# Patient Record
Sex: MALE | Race: WHITE | NOT HISPANIC OR LATINO | Employment: FULL TIME | URBAN - METROPOLITAN AREA
[De-identification: names, ages, dates, MRNs, and addresses within clinical notes are randomized per-mention and may not be internally consistent; named-entity substitution may affect disease eponyms.]

---

## 2017-06-07 ENCOUNTER — OFFICE VISIT (OUTPATIENT)
Dept: PULMONOLOGY | Facility: CLINIC | Age: 37
End: 2017-06-07
Payer: COMMERCIAL

## 2017-06-07 ENCOUNTER — CLINICAL SUPPORT (OUTPATIENT)
Dept: INTERNAL MEDICINE | Facility: CLINIC | Age: 37
End: 2017-06-07
Payer: COMMERCIAL

## 2017-06-07 VITALS
SYSTOLIC BLOOD PRESSURE: 124 MMHG | HEART RATE: 50 BPM | WEIGHT: 201 LBS | DIASTOLIC BLOOD PRESSURE: 80 MMHG | HEIGHT: 70 IN | BODY MASS INDEX: 28.77 KG/M2

## 2017-06-07 DIAGNOSIS — Z00.00 ROUTINE GENERAL MEDICAL EXAMINATION AT A HEALTH CARE FACILITY: Primary | ICD-10-CM

## 2017-06-07 DIAGNOSIS — Z00.00 ANNUAL PHYSICAL EXAM: Primary | ICD-10-CM

## 2017-06-07 LAB
ALBUMIN SERPL BCP-MCNC: 4.2 G/DL
ALP SERPL-CCNC: 74 U/L
ALT SERPL W/O P-5'-P-CCNC: 46 U/L
ANION GAP SERPL CALC-SCNC: 11 MMOL/L
AST SERPL-CCNC: 24 U/L
BILIRUB SERPL-MCNC: 0.8 MG/DL
BUN SERPL-MCNC: 12 MG/DL
CALCIUM SERPL-MCNC: 10 MG/DL
CHLORIDE SERPL-SCNC: 103 MMOL/L
CHOLEST/HDLC SERPL: 4 {RATIO}
CO2 SERPL-SCNC: 26 MMOL/L
CREAT SERPL-MCNC: 1 MG/DL
ERYTHROCYTE [DISTWIDTH] IN BLOOD BY AUTOMATED COUNT: 12.6 %
EST. GFR  (AFRICAN AMERICAN): >60 ML/MIN/1.73 M^2
EST. GFR  (NON AFRICAN AMERICAN): >60 ML/MIN/1.73 M^2
GLUCOSE SERPL-MCNC: 83 MG/DL
HCT VFR BLD AUTO: 47.5 %
HDL/CHOLESTEROL RATIO: 24.8 %
HDLC SERPL-MCNC: 218 MG/DL
HDLC SERPL-MCNC: 54 MG/DL
HGB BLD-MCNC: 16.3 G/DL
HIV 1+2 AB+HIV1 P24 AG SERPL QL IA: NEGATIVE
LDLC SERPL CALC-MCNC: 125.2 MG/DL
MCH RBC QN AUTO: 31.2 PG
MCHC RBC AUTO-ENTMCNC: 34.3 %
MCV RBC AUTO: 91 FL
NONHDLC SERPL-MCNC: 164 MG/DL
PLATELET # BLD AUTO: 231 K/UL
PMV BLD AUTO: 10.5 FL
POTASSIUM SERPL-SCNC: 4.4 MMOL/L
PROT SERPL-MCNC: 7.4 G/DL
RBC # BLD AUTO: 5.22 M/UL
SODIUM SERPL-SCNC: 140 MMOL/L
TRIGL SERPL-MCNC: 194 MG/DL
WBC # BLD AUTO: 7.96 K/UL

## 2017-06-07 PROCEDURE — 36415 COLL VENOUS BLD VENIPUNCTURE: CPT

## 2017-06-07 PROCEDURE — 86703 HIV-1/HIV-2 1 RESULT ANTBDY: CPT

## 2017-06-07 PROCEDURE — 99999 PR PBB SHADOW E&M-EST. PATIENT-LVL III: CPT | Mod: PBBFAC,,, | Performed by: INTERNAL MEDICINE

## 2017-06-07 PROCEDURE — 80061 LIPID PANEL: CPT

## 2017-06-07 PROCEDURE — 99395 PREV VISIT EST AGE 18-39: CPT | Mod: S$GLB,,, | Performed by: INTERNAL MEDICINE

## 2017-06-07 PROCEDURE — 80053 COMPREHEN METABOLIC PANEL: CPT

## 2017-06-07 PROCEDURE — 85027 COMPLETE CBC AUTOMATED: CPT
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
## 2017-06-07 NOTE — LETTER
June 7, 2017    Elpidio Mooney  [address]  [place] [address]             [facility]  [address]  [place] [address]  Phone: [phone] Dear Mr. Mooney:       Thank you for allowing me to serve you and perform your Executive Health exam on 6/7/2017. This letter will serve as a brief summary of the physical findings and laboratory/studies performed and recommendations at this time. At this assessment that is a mild elevation on your cholesterol and triglyceride values. This will be improved with diet modification.        If you have any questions or concerns, please don't hesitate to call.    Sincerely,        Jordan Barger MD

## 2018-01-12 ENCOUNTER — OFFICE VISIT (OUTPATIENT)
Dept: URGENT CARE | Facility: CLINIC | Age: 38
End: 2018-01-12
Payer: COMMERCIAL

## 2018-01-12 VITALS
WEIGHT: 201 LBS | BODY MASS INDEX: 28.77 KG/M2 | HEIGHT: 70 IN | OXYGEN SATURATION: 99 % | RESPIRATION RATE: 18 BRPM | DIASTOLIC BLOOD PRESSURE: 73 MMHG | TEMPERATURE: 101 F | HEART RATE: 78 BPM | SYSTOLIC BLOOD PRESSURE: 126 MMHG

## 2018-01-12 DIAGNOSIS — L02.212 ABSCESS OF BACK: Primary | ICD-10-CM

## 2018-01-12 PROCEDURE — 99214 OFFICE O/P EST MOD 30 MIN: CPT | Mod: S$GLB,,, | Performed by: EMERGENCY MEDICINE

## 2018-01-12 RX ORDER — HYDROCODONE BITARTRATE AND ACETAMINOPHEN 10; 325 MG/1; MG/1
1 TABLET ORAL EVERY 8 HOURS PRN
Qty: 8 TABLET | Refills: 0 | Status: SHIPPED | OUTPATIENT
Start: 2018-01-12 | End: 2018-01-15

## 2018-01-12 RX ORDER — DOXYCYCLINE 100 MG/1
100 CAPSULE ORAL 2 TIMES DAILY
Qty: 20 CAPSULE | Refills: 0 | Status: SHIPPED | OUTPATIENT
Start: 2018-01-12 | End: 2018-01-22

## 2018-01-12 RX ORDER — CLINDAMYCIN PHOSPHATE 150 MG/ML
300 INJECTION, SOLUTION INTRAVENOUS
Status: COMPLETED | OUTPATIENT
Start: 2018-01-12 | End: 2018-01-12

## 2018-01-12 RX ADMIN — CLINDAMYCIN PHOSPHATE 300 MG: 150 INJECTION, SOLUTION INTRAVENOUS at 12:01

## 2018-01-12 NOTE — PATIENT INSTRUCTIONS
If not improved in 1-2 days return.    Preet Padilla MD  Go to the Emergency Department for any problems  Call your PCP for follow up next available.    Abscess (Antibiotic Treatment Only)  An abscess (sometimes called a boil) happens when bacteria get trapped under the skin and start to grow. Pus forms inside the abscess as the body responds to the bacteria. An abscess can happen with an insect bite, ingrown hair, blocked oil gland, pimple, cyst, or puncture wound.  In the early stages, your wound may be red and tender. For this stage, you may get antibiotics. If the abscess does not get better with antibiotics, it will need to be drained with a small cut.  Home care  These tips will help you care for your abscess at home:  · Soak the wound in hot water or apply hot packs (small towel soaked in hot water) to the area for 20 minutes at a time. Do this 3 to 4 times a day.  · Do not cut, squeeze, or pop the boil yourself.  · Apply antibiotic cream or ointment to the skin 3 to 4 times a day, unless something else was prescribed. Some ointments include an antibiotic plus a pain reliever.  · If your doctor prescribed antibiotics, do not stop taking them until you have finished the medicine or the doctor tells you to stop.  · You may use an over-the-counter pain medicine to control pain, unless another pain medicine was prescribed. If you have chronic liver or kidney disease or ever had a stomach ulcer or gastrointestinal bleeding, talk with your doctor before using these any of these.  Follow-up care  Follow up with your healthcare provider, or as advised. Check your wound each day for the signs of worsening infection listed below.  When to seek medical advice  Get prompt medical attention if any of these occur:  · An increase in redness or swelling  · Red streaks in the skin leading away from the abscess  · An increase in local pain or swelling  · Fever of 100.4ºF (38ºC) or higher, or as directed by your healthcare  provider  · Pus or fluid coming from the abscess  · Boil returns after getting better  Date Last Reviewed: 9/1/2016  © 5222-4552 [employer]. [address] [place] [address], [place] [address]. All rights reserved. This information is not intended as a substitute for professional medical care. Always follow your healthcare professional's instructions.

## 2018-01-12 NOTE — PROGRESS NOTES
"Subjective:       Patient ID: Elpidio Mooney is a 37 y.o. male.    Vitals:  height is 5' 10" (1.778 m) and weight is 91.2 kg (201 lb). His temperature is 101.2 °F (38.4 °C) (abnormal). His blood pressure is 126/73 and his pulse is 78. His respiration is 18 and oxygen saturation is 99%.     Chief Complaint: Abscess    This is a 37 y.o. male with History reviewed. No pertinent past medical history.    who presents today with a chief complaint of abscess on his lower back.  The patient states that he has had 4 since the start of the year.        Abscess   Chronicity:  NewProgression Since Onset: rapidly worsening  Abscess location: back   Associated Symptoms: a fever, no chills  Characteristics: draining and painful    Pain Scale:  8/10  Treatments Tried:  Topical antibiotics  Relieved by:  Nothing    Review of Systems   Constitution: Positive for fever. Negative for chills.   HENT: Negative for sore throat.    Respiratory: Negative for shortness of breath.    Skin: Negative for itching and rash.   Musculoskeletal: Negative for joint pain.       Objective:      Physical Exam   Constitutional: He is oriented to person, place, and time. He appears well-developed and well-nourished.   HENT:   Head: Normocephalic and atraumatic.   Eyes: EOM are normal. Pupils are equal, round, and reactive to light.   Neck: Normal range of motion. Neck supple.   Cardiovascular: Normal rate, regular rhythm and normal heart sounds.    Pulmonary/Chest: Breath sounds normal.   Musculoskeletal: Normal range of motion.   Neurological: He is alert and oriented to person, place, and time.   Skin:   Mid lumbar area oval area of erythema/edema, mild amt pyogenic liquid expressed, approx 1.5 cm diameter   Psychiatric: He has a normal mood and affect. His behavior is normal.       Assessment:       1. Abscess of back        Plan:         Abscess of back  -     clindamycin injection 300 mg; Inject 2 mLs (300 mg total) into the muscle one time.  -     " doxycycline (VIBRAMYCIN) 100 MG Cap; Take 1 capsule (100 mg total) by mouth 2 (two) times daily. Generic or equivalent OK  Dispense: 20 capsule; Refill: 0  -     hydrocodone-acetaminophen 10-325mg (NORCO)  mg Tab; Take 1 tablet by mouth every 8 (eight) hours as needed for Pain. 0.5-1 tablet as needed  Dispense: 8 tablet; Refill: 0      Preet Padilla MD  Go to the Emergency Department for any problems  Call your PCP for follow up next available.

## 2018-01-15 ENCOUNTER — TELEPHONE (OUTPATIENT)
Dept: URGENT CARE | Facility: CLINIC | Age: 38
End: 2018-01-15

## 2018-01-15 NOTE — TELEPHONE ENCOUNTER
Call back from date of service 01/12/18. Went to Dermatologist after appointment here on Friday. GISELL

## 2021-02-26 DIAGNOSIS — Z00.00 ROUTINE GENERAL MEDICAL EXAMINATION AT A HEALTH CARE FACILITY: Primary | ICD-10-CM

## 2021-03-22 ENCOUNTER — OFFICE VISIT (OUTPATIENT)
Dept: INTERNAL MEDICINE | Facility: CLINIC | Age: 41
End: 2021-03-22
Payer: COMMERCIAL

## 2021-03-22 ENCOUNTER — CLINICAL SUPPORT (OUTPATIENT)
Dept: INTERNAL MEDICINE | Facility: CLINIC | Age: 41
End: 2021-03-22
Payer: COMMERCIAL

## 2021-03-22 ENCOUNTER — HOSPITAL ENCOUNTER (OUTPATIENT)
Dept: CARDIOLOGY | Facility: CLINIC | Age: 41
Discharge: HOME | End: 2021-03-22
Payer: COMMERCIAL

## 2021-03-22 VITALS
BODY MASS INDEX: 30.68 KG/M2 | DIASTOLIC BLOOD PRESSURE: 80 MMHG | WEIGHT: 213.88 LBS | SYSTOLIC BLOOD PRESSURE: 130 MMHG | HEART RATE: 71 BPM | OXYGEN SATURATION: 99 %

## 2021-03-22 DIAGNOSIS — E78.2 MIXED HYPERLIPIDEMIA: ICD-10-CM

## 2021-03-22 DIAGNOSIS — Z00.00 ANNUAL PHYSICAL EXAM: Primary | ICD-10-CM

## 2021-03-22 DIAGNOSIS — R51.9 MORNING HEADACHE: ICD-10-CM

## 2021-03-22 DIAGNOSIS — R68.82 DECREASED LIBIDO: ICD-10-CM

## 2021-03-22 DIAGNOSIS — E66.09 CLASS 1 OBESITY DUE TO EXCESS CALORIES WITH SERIOUS COMORBIDITY AND BODY MASS INDEX (BMI) OF 30.0 TO 30.9 IN ADULT: ICD-10-CM

## 2021-03-22 DIAGNOSIS — Z00.00 ROUTINE GENERAL MEDICAL EXAMINATION AT A HEALTH CARE FACILITY: ICD-10-CM

## 2021-03-22 DIAGNOSIS — Z12.83 SKIN EXAM, SCREENING FOR CANCER: ICD-10-CM

## 2021-03-22 DIAGNOSIS — Z00.00 ROUTINE GENERAL MEDICAL EXAMINATION AT A HEALTH CARE FACILITY: Primary | ICD-10-CM

## 2021-03-22 DIAGNOSIS — R06.83 SNORING: ICD-10-CM

## 2021-03-22 DIAGNOSIS — Z11.59 NEED FOR HEPATITIS C SCREENING TEST: ICD-10-CM

## 2021-03-22 DIAGNOSIS — R53.83 FATIGUE, UNSPECIFIED TYPE: ICD-10-CM

## 2021-03-22 DIAGNOSIS — R74.01 ELEVATED ALT MEASUREMENT: ICD-10-CM

## 2021-03-22 PROBLEM — L02.212 ABSCESS OF BACK: Status: RESOLVED | Noted: 2018-01-12 | Resolved: 2021-03-22

## 2021-03-22 PROBLEM — E66.811 CLASS 1 OBESITY DUE TO EXCESS CALORIES WITH SERIOUS COMORBIDITY AND BODY MASS INDEX (BMI) OF 30.0 TO 30.9 IN ADULT: Status: ACTIVE | Noted: 2021-03-22

## 2021-03-22 LAB
ALBUMIN SERPL BCP-MCNC: 4.4 G/DL (ref 3.5–5.2)
ALP SERPL-CCNC: 56 U/L (ref 55–135)
ALT SERPL W/O P-5'-P-CCNC: 71 U/L (ref 10–44)
ANION GAP SERPL CALC-SCNC: 4 MMOL/L (ref 8–16)
AST SERPL-CCNC: 33 U/L (ref 10–40)
BILIRUB SERPL-MCNC: 0.7 MG/DL (ref 0.1–1)
BUN SERPL-MCNC: 13 MG/DL (ref 6–20)
CALCIUM SERPL-MCNC: 9.3 MG/DL (ref 8.7–10.5)
CHLORIDE SERPL-SCNC: 105 MMOL/L (ref 95–110)
CHOLEST SERPL-MCNC: 212 MG/DL (ref 120–199)
CHOLEST/HDLC SERPL: 4.2 {RATIO} (ref 2–5)
CO2 SERPL-SCNC: 28 MMOL/L (ref 23–29)
CREAT SERPL-MCNC: 1 MG/DL (ref 0.5–1.4)
ERYTHROCYTE [DISTWIDTH] IN BLOOD BY AUTOMATED COUNT: 12.4 % (ref 11.5–14.5)
EST. GFR  (AFRICAN AMERICAN): >60 ML/MIN/1.73 M^2
EST. GFR  (NON AFRICAN AMERICAN): >60 ML/MIN/1.73 M^2
GLUCOSE SERPL-MCNC: 89 MG/DL (ref 70–110)
HCT VFR BLD AUTO: 46.7 % (ref 40–54)
HDLC SERPL-MCNC: 50 MG/DL (ref 40–75)
HDLC SERPL: 23.6 % (ref 20–50)
HGB BLD-MCNC: 15.6 G/DL (ref 14–18)
LDLC SERPL CALC-MCNC: 117 MG/DL (ref 63–159)
MCH RBC QN AUTO: 31.3 PG (ref 27–31)
MCHC RBC AUTO-ENTMCNC: 33.4 G/DL (ref 32–36)
MCV RBC AUTO: 94 FL (ref 82–98)
NONHDLC SERPL-MCNC: 162 MG/DL
PLATELET # BLD AUTO: 254 K/UL (ref 150–350)
PMV BLD AUTO: 10.5 FL (ref 9.2–12.9)
POTASSIUM SERPL-SCNC: 4.6 MMOL/L (ref 3.5–5.1)
PROT SERPL-MCNC: 7.2 G/DL (ref 6–8.4)
RBC # BLD AUTO: 4.99 M/UL (ref 4.6–6.2)
SODIUM SERPL-SCNC: 137 MMOL/L (ref 136–145)
TRIGL SERPL-MCNC: 225 MG/DL (ref 30–150)
WBC # BLD AUTO: 6.68 K/UL (ref 3.9–12.7)

## 2021-03-22 PROCEDURE — 93010 ELECTROCARDIOGRAM REPORT: CPT | Mod: S$GLB,,, | Performed by: INTERNAL MEDICINE

## 2021-03-22 PROCEDURE — 99385 PR PREVENTIVE VISIT,NEW,18-39: ICD-10-PCS | Mod: S$GLB,,, | Performed by: FAMILY MEDICINE

## 2021-03-22 PROCEDURE — 93005 EKG 12-LEAD: ICD-10-PCS | Mod: S$GLB,,, | Performed by: FAMILY MEDICINE

## 2021-03-22 PROCEDURE — 93010 EKG 12-LEAD: ICD-10-PCS | Mod: S$GLB,,, | Performed by: INTERNAL MEDICINE

## 2021-03-22 PROCEDURE — 99999 PR PBB SHADOW E&M-EST. PATIENT-LVL III: ICD-10-PCS | Mod: PBBFAC,,, | Performed by: FAMILY MEDICINE

## 2021-03-22 PROCEDURE — 85027 COMPLETE CBC AUTOMATED: CPT | Performed by: FAMILY MEDICINE

## 2021-03-22 PROCEDURE — 80061 LIPID PANEL: CPT | Performed by: FAMILY MEDICINE

## 2021-03-22 PROCEDURE — 99999 PR PBB SHADOW E&M-EST. PATIENT-LVL III: CPT | Mod: PBBFAC,,, | Performed by: FAMILY MEDICINE

## 2021-03-22 PROCEDURE — 93005 ELECTROCARDIOGRAM TRACING: CPT | Mod: S$GLB,,, | Performed by: FAMILY MEDICINE

## 2021-03-22 PROCEDURE — 80053 COMPREHEN METABOLIC PANEL: CPT | Performed by: FAMILY MEDICINE

## 2021-03-22 PROCEDURE — 99385 PREV VISIT NEW AGE 18-39: CPT | Mod: S$GLB,,, | Performed by: FAMILY MEDICINE

## 2021-03-22 PROCEDURE — 36415 COLL VENOUS BLD VENIPUNCTURE: CPT | Performed by: FAMILY MEDICINE

## 2021-03-22 RX ORDER — IBUPROFEN AND FAMOTIDINE 800; 26.6 MG/1; MG/1
TABLET, COATED ORAL
COMMUNITY
End: 2022-07-15

## 2021-04-27 ENCOUNTER — PATIENT MESSAGE (OUTPATIENT)
Dept: INTERNAL MEDICINE | Facility: CLINIC | Age: 41
End: 2021-04-27

## 2021-05-03 ENCOUNTER — TELEPHONE (OUTPATIENT)
Dept: INTERNAL MEDICINE | Facility: CLINIC | Age: 41
End: 2021-05-03

## 2021-05-03 ENCOUNTER — PATIENT MESSAGE (OUTPATIENT)
Dept: INTERNAL MEDICINE | Facility: CLINIC | Age: 41
End: 2021-05-03

## 2021-05-03 DIAGNOSIS — R74.01 ELEVATED ALT MEASUREMENT: Primary | ICD-10-CM

## 2021-05-04 ENCOUNTER — PATIENT MESSAGE (OUTPATIENT)
Dept: RESEARCH | Facility: HOSPITAL | Age: 41
End: 2021-05-04

## 2021-05-05 ENCOUNTER — PATIENT MESSAGE (OUTPATIENT)
Dept: INTERNAL MEDICINE | Facility: CLINIC | Age: 41
End: 2021-05-05

## 2021-05-19 ENCOUNTER — PATIENT MESSAGE (OUTPATIENT)
Dept: INTERNAL MEDICINE | Facility: CLINIC | Age: 41
End: 2021-05-19

## 2021-08-27 ENCOUNTER — OFFICE VISIT (OUTPATIENT)
Dept: DERMATOLOGY | Facility: CLINIC | Age: 41
End: 2021-08-27
Payer: COMMERCIAL

## 2021-08-27 DIAGNOSIS — L72.0 EPIDERMAL INCLUSION CYST: ICD-10-CM

## 2021-08-27 DIAGNOSIS — Q82.5 CONGENITAL NEVUS OF FLANK: ICD-10-CM

## 2021-08-27 DIAGNOSIS — L57.9 SKIN CHANGES DUE TO CHRONIC EXPOSURE TO NONIONIZING RADIATION: Primary | ICD-10-CM

## 2021-08-27 DIAGNOSIS — Z12.83 SKIN EXAM, SCREENING FOR CANCER: ICD-10-CM

## 2021-08-27 PROCEDURE — 99203 OFFICE O/P NEW LOW 30 MIN: CPT | Mod: S$GLB,,, | Performed by: DERMATOLOGY

## 2021-08-27 PROCEDURE — 99203 PR OFFICE/OUTPT VISIT, NEW, LEVL III, 30-44 MIN: ICD-10-PCS | Mod: S$GLB,,, | Performed by: DERMATOLOGY

## 2021-08-27 PROCEDURE — 99999 PR PBB SHADOW E&M-EST. PATIENT-LVL II: CPT | Mod: PBBFAC,,,

## 2021-08-27 PROCEDURE — 99999 PR PBB SHADOW E&M-EST. PATIENT-LVL II: ICD-10-PCS | Mod: PBBFAC,,,

## 2021-09-22 ENCOUNTER — PATIENT MESSAGE (OUTPATIENT)
Dept: INTERNAL MEDICINE | Facility: CLINIC | Age: 41
End: 2021-09-22

## 2021-09-22 DIAGNOSIS — U07.1 COVID-19 VIRUS INFECTION: Primary | ICD-10-CM

## 2022-04-05 DIAGNOSIS — Z00.00 ROUTINE GENERAL MEDICAL EXAMINATION AT A HEALTH CARE FACILITY: Primary | ICD-10-CM

## 2022-07-15 ENCOUNTER — OFFICE VISIT (OUTPATIENT)
Dept: INTERNAL MEDICINE | Facility: CLINIC | Age: 42
End: 2022-07-15
Payer: COMMERCIAL

## 2022-07-15 ENCOUNTER — CLINICAL SUPPORT (OUTPATIENT)
Dept: INTERNAL MEDICINE | Facility: CLINIC | Age: 42
End: 2022-07-15
Payer: COMMERCIAL

## 2022-07-15 ENCOUNTER — HOSPITAL ENCOUNTER (OUTPATIENT)
Dept: CARDIOLOGY | Facility: CLINIC | Age: 42
Discharge: HOME | End: 2022-07-15
Payer: COMMERCIAL

## 2022-07-15 VITALS
SYSTOLIC BLOOD PRESSURE: 135 MMHG | DIASTOLIC BLOOD PRESSURE: 80 MMHG | WEIGHT: 205 LBS | BODY MASS INDEX: 29.35 KG/M2 | HEIGHT: 70 IN

## 2022-07-15 DIAGNOSIS — Z00.00 ROUTINE GENERAL MEDICAL EXAMINATION AT A HEALTH CARE FACILITY: ICD-10-CM

## 2022-07-15 DIAGNOSIS — L57.8 ACTINIC DERMATITIS: ICD-10-CM

## 2022-07-15 DIAGNOSIS — Z00.00 ROUTINE GENERAL MEDICAL EXAMINATION AT A HEALTH CARE FACILITY: Primary | ICD-10-CM

## 2022-07-15 DIAGNOSIS — E66.3 OVERWEIGHT (BMI 25.0-29.9): ICD-10-CM

## 2022-07-15 DIAGNOSIS — Z00.00 ANNUAL PHYSICAL EXAM: Primary | ICD-10-CM

## 2022-07-15 DIAGNOSIS — K76.0 FATTY LIVER: ICD-10-CM

## 2022-07-15 PROBLEM — E66.09 CLASS 1 OBESITY DUE TO EXCESS CALORIES WITH SERIOUS COMORBIDITY AND BODY MASS INDEX (BMI) OF 30.0 TO 30.9 IN ADULT: Status: RESOLVED | Noted: 2021-03-22 | Resolved: 2022-07-15

## 2022-07-15 PROBLEM — E66.811 CLASS 1 OBESITY DUE TO EXCESS CALORIES WITH SERIOUS COMORBIDITY AND BODY MASS INDEX (BMI) OF 30.0 TO 30.9 IN ADULT: Status: RESOLVED | Noted: 2021-03-22 | Resolved: 2022-07-15

## 2022-07-15 LAB
ALBUMIN SERPL BCP-MCNC: 4.2 G/DL (ref 3.5–5.2)
ALP SERPL-CCNC: 46 U/L (ref 55–135)
ALT SERPL W/O P-5'-P-CCNC: 63 U/L (ref 10–44)
ANION GAP SERPL CALC-SCNC: 7 MMOL/L (ref 8–16)
AST SERPL-CCNC: 32 U/L (ref 10–40)
BILIRUB SERPL-MCNC: 0.8 MG/DL (ref 0.1–1)
BUN SERPL-MCNC: 13 MG/DL (ref 6–20)
CALCIUM SERPL-MCNC: 9.3 MG/DL (ref 8.7–10.5)
CHLORIDE SERPL-SCNC: 106 MMOL/L (ref 95–110)
CHOLEST SERPL-MCNC: 227 MG/DL (ref 120–199)
CHOLEST/HDLC SERPL: 4.5 {RATIO} (ref 2–5)
CO2 SERPL-SCNC: 27 MMOL/L (ref 23–29)
COMPLEXED PSA SERPL-MCNC: 0.3 NG/ML (ref 0–4)
CREAT SERPL-MCNC: 1 MG/DL (ref 0.5–1.4)
ERYTHROCYTE [DISTWIDTH] IN BLOOD BY AUTOMATED COUNT: 12.1 % (ref 11.5–14.5)
EST. GFR  (AFRICAN AMERICAN): >60 ML/MIN/1.73 M^2
EST. GFR  (NON AFRICAN AMERICAN): >60 ML/MIN/1.73 M^2
GLUCOSE SERPL-MCNC: 86 MG/DL (ref 70–110)
HCT VFR BLD AUTO: 45.5 % (ref 40–54)
HDLC SERPL-MCNC: 51 MG/DL (ref 40–75)
HDLC SERPL: 22.5 % (ref 20–50)
HGB BLD-MCNC: 15.6 G/DL (ref 14–18)
LDLC SERPL CALC-MCNC: 154.2 MG/DL (ref 63–159)
MCH RBC QN AUTO: 32.3 PG (ref 27–31)
MCHC RBC AUTO-ENTMCNC: 34.3 G/DL (ref 32–36)
MCV RBC AUTO: 94 FL (ref 82–98)
NONHDLC SERPL-MCNC: 176 MG/DL
PLATELET # BLD AUTO: 215 K/UL (ref 150–450)
PMV BLD AUTO: 10.7 FL (ref 9.2–12.9)
POTASSIUM SERPL-SCNC: 4.2 MMOL/L (ref 3.5–5.1)
PROT SERPL-MCNC: 6.9 G/DL (ref 6–8.4)
RBC # BLD AUTO: 4.83 M/UL (ref 4.6–6.2)
SODIUM SERPL-SCNC: 140 MMOL/L (ref 136–145)
TRIGL SERPL-MCNC: 109 MG/DL (ref 30–150)
WBC # BLD AUTO: 5.74 K/UL (ref 3.9–12.7)

## 2022-07-15 PROCEDURE — 93005 EKG 12-LEAD: ICD-10-PCS | Mod: S$GLB,,, | Performed by: INTERNAL MEDICINE

## 2022-07-15 PROCEDURE — 99396 PREV VISIT EST AGE 40-64: CPT | Mod: S$GLB,,, | Performed by: INTERNAL MEDICINE

## 2022-07-15 PROCEDURE — 99999 PR PBB SHADOW E&M-EST. PATIENT-LVL III: ICD-10-PCS | Mod: PBBFAC,,, | Performed by: INTERNAL MEDICINE

## 2022-07-15 PROCEDURE — 99999 PR PBB SHADOW E&M-EST. PATIENT-LVL I: CPT | Mod: PBBFAC,,,

## 2022-07-15 PROCEDURE — 93010 ELECTROCARDIOGRAM REPORT: CPT | Mod: S$GLB,,, | Performed by: INTERNAL MEDICINE

## 2022-07-15 PROCEDURE — 99999 PR PBB SHADOW E&M-EST. PATIENT-LVL III: CPT | Mod: PBBFAC,,, | Performed by: INTERNAL MEDICINE

## 2022-07-15 PROCEDURE — 99396 PR PREVENTIVE VISIT,EST,40-64: ICD-10-PCS | Mod: S$GLB,,, | Performed by: INTERNAL MEDICINE

## 2022-07-15 PROCEDURE — 99999 PR PBB SHADOW E&M-EST. PATIENT-LVL I: ICD-10-PCS | Mod: PBBFAC,,,

## 2022-07-15 PROCEDURE — 80053 COMPREHEN METABOLIC PANEL: CPT | Performed by: INTERNAL MEDICINE

## 2022-07-15 PROCEDURE — 80061 LIPID PANEL: CPT | Performed by: INTERNAL MEDICINE

## 2022-07-15 PROCEDURE — 93005 ELECTROCARDIOGRAM TRACING: CPT | Mod: S$GLB,,, | Performed by: INTERNAL MEDICINE

## 2022-07-15 PROCEDURE — 84153 ASSAY OF PSA TOTAL: CPT | Performed by: INTERNAL MEDICINE

## 2022-07-15 PROCEDURE — 93010 EKG 12-LEAD: ICD-10-PCS | Mod: S$GLB,,, | Performed by: INTERNAL MEDICINE

## 2022-07-15 PROCEDURE — 85027 COMPLETE CBC AUTOMATED: CPT | Performed by: INTERNAL MEDICINE

## 2022-07-15 NOTE — LETTER
July 15, 2022    Elpidio Mooney  [address]  [place] [address]             [facility]  [address]  [facility] [address]  Phone: [phone]  Fax: [phone] Dear Mr. Mooney:    Thank you for allowing me to serve you and perform your Executive Health exam on 7/15/2022.  This letter will serve a brief summary of the history, physical findings, and laboratory/studies performed and recommendations at that time.    Reason for Visit: Executive Health Preventive Physical Examination    Past Medical History:   Diagnosis Date    Abscess of back 01/12/2018    Hyperlipidemia        Past Surgical History:   Procedure Laterality Date    FEMUR FRACTURE SURGERY  2000    HERNIA REPAIR         Family History   Problem Relation Age of Onset    Heart disease Father         A fib    Cancer Father         skin    Skin cancer Father     No Known Problems Sister     No Known Problems Brother     Heart disease Maternal Grandmother         CHF    Cancer Maternal Grandfather         prostate vs rectal    Cancer Paternal Grandfather         bone marrow?       Social History     Socioeconomic History    Marital status:     Number of children: 2   Tobacco Use    Smoking status: Never Smoker    Smokeless tobacco: Never Used   Substance and Sexual Activity    Alcohol use: Yes     Alcohol/week: 3.0 standard drinks     Types: 3 Cans of beer per week    Drug use: No    Sexual activity: Yes     Partners: Female        Review of patient's allergies indicates:  No Known Allergies    No current outpatient medications on file.     Review of Systems  Review of Systems - Negative     Physical Exam:  General: General appearance: alert, well appearing, and in no distress.   Skin: Skin exam - normal coloration and turgor, no rashes, no suspicious skin lesions noted.  Sun exposed and sun damaged skin on face and arms  HEENT: Ears - bilateral TM's and external ear canals normal. , ENT exam  reveals - ENT exam normal, no neck nodes or sinus tenderness.   Lungs: Chest: clear to auscultation, no wheezes, rales or rhonchi, symmetric air entry.   Heart: CVS exam: normal rate, regular rhythm, normal S1, S2, no murmurs, rubs, clicks or gallops.   Extremities: Exam of extremities: peripheral pulses normal, no pedal edema, no clubbing or cyanosis    Labs:  Results for orders placed or performed in visit on 07/15/22   Comprehensive metabolic panel   Result Value Ref Range    Sodium 140 136 - 145 mmol/L    Potassium 4.2 3.5 - 5.1 mmol/L    Chloride 106 95 - 110 mmol/L    CO2 27 23 - 29 mmol/L    Glucose 86 70 - 110 mg/dL    BUN 13 6 - 20 mg/dL    Creatinine 1.0 0.5 - 1.4 mg/dL    Calcium 9.3 8.7 - 10.5 mg/dL    Total Protein 6.9 6.0 - 8.4 g/dL    Albumin 4.2 3.5 - 5.2 g/dL    Total Bilirubin 0.8 0.1 - 1.0 mg/dL    Alkaline Phosphatase 46 (L) 55 - 135 U/L    AST 32 10 - 40 U/L    ALT 63 (H) 10 - 44 U/L    Anion Gap 7 (L) 8 - 16 mmol/L    eGFR if African American >60.0 >60 mL/min/1.73 m^2    eGFR if non African American >60.0 >60 mL/min/1.73 m^2   CBC Without Differential   Result Value Ref Range    WBC 5.74 3.90 - 12.70 K/uL    RBC 4.83 4.60 - 6.20 M/uL    Hemoglobin 15.6 14.0 - 18.0 g/dL    Hematocrit 45.5 40.0 - 54.0 %    MCV 94 82 - 98 fL    MCH 32.3 (H) 27.0 - 31.0 pg    MCHC 34.3 32.0 - 36.0 g/dL    RDW 12.1 11.5 - 14.5 %    Platelets 215 150 - 450 K/uL    MPV 10.7 9.2 - 12.9 fL   Lipid panel   Result Value Ref Range    Cholesterol 227 (H) 120 - 199 mg/dL    Triglycerides 109 30 - 150 mg/dL    HDL 51 40 - 75 mg/dL    LDL Cholesterol 154.2 63.0 - 159.0 mg/dL    HDL/Cholesterol Ratio 22.5 20.0 - 50.0 %    Total Cholesterol/HDL Ratio 4.5 2.0 - 5.0    Non-HDL Cholesterol 176 mg/dL   PSA, Screening   Result Value Ref Range    PSA, Screen 0.30 0.00 - 4.00 ng/mL      EKG was acceptable.    Assessment/Recommendations:  Routine Health Maintenance; I recommend consideration of the pneumonia vaccine given your fatty  liver.  I also recommend consideration of a COVID booster.    With regard to your high cholesterol and fatty liver, I recommend adhering to a low-carbohydrate, portion control, plant based eating plan and regular vigorous exercise.  Try to curtail alcohol use.  I recommend a formal consultation with a liver specialist to make sure that you do not have any scarring in your liver.  Diet, exercise and weight loss are the recommended treatments for this.    Be sure to wear sunscreen every single day as well as to cover up to avoid sunburn.  I would recommend a baseline dermatology assessment at this time.    Please contact me should you have any questions or concerns regarding physical findings, or my recommendations.  I would recommend follow-up within the next 6-12 months regarding her liver and other medical issues.        Sincerely,            Susan Preciado MD, FACP

## 2022-07-15 NOTE — PROGRESS NOTES
PE; here with wife and 2 kids.    Commercial AC, refrigeration- very busy.    Fatty liver, discussed.  He was aware.  He does not drink alcohol very frequently but sometimes does drink to excess on the weekends.    Deliberate weight loss.  Stays very active.  No apnea symptoms.    Lipids also reviewed.    The 10-year ASCVD risk score (Samira PEARSON Jr., et al., 2013) is: 1.5%    Values used to calculate the score:      Age: 41 years      Sex: Male      Is Non- : No      Diabetic: No      Tobacco smoker: No      Systolic Blood Pressure: 135 mmHg      Is BP treated: No      HDL Cholesterol: 51 mg/dL      Total Cholesterol: 227 mg/dL     Patient Active Problem List   Diagnosis    Mixed hyperlipidemia    Elevated ALT measurement    Overweight (BMI 25.0-29.9)    Fatty liver: see u/s 5/21     Review of Systems   Constitutional: Negative for chills, fever, malaise/fatigue and weight loss.   HENT: Negative for congestion, ear pain and hearing loss.    Eyes: Negative for blurred vision, double vision and pain.   Respiratory: Negative for cough, hemoptysis, shortness of breath and wheezing.    Cardiovascular: Negative for chest pain, palpitations, orthopnea and claudication.   Gastrointestinal: Negative for abdominal pain, heartburn and nausea.   Genitourinary: Negative for dysuria and hematuria.   Musculoskeletal: Negative for falls, joint pain and myalgias.   Skin: Negative for rash.   Neurological: Negative for dizziness, tremors, focal weakness and headaches.   Endo/Heme/Allergies: Negative for polydipsia. Does not bruise/bleed easily.   Psychiatric/Behavioral: Negative for depression, hallucinations and substance abuse. The patient does not have insomnia.      Physical Exam  Constitutional:       Appearance: He is well-developed.   HENT:      Head: Normocephalic and atraumatic.      Right Ear: External ear normal.      Left Ear: External ear normal.   Eyes:      Extraocular Movements: Extraocular  movements intact.      Conjunctiva/sclera: Conjunctivae normal.   Neck:      Thyroid: No thyromegaly.   Cardiovascular:      Rate and Rhythm: Normal rate and regular rhythm.      Heart sounds: No murmur heard.  Pulmonary:      Effort: Pulmonary effort is normal. No respiratory distress.      Breath sounds: Normal breath sounds. No wheezing.   Abdominal:      General: There is no distension.      Palpations: Abdomen is soft.      Tenderness: There is no abdominal tenderness.   Musculoskeletal:         General: No tenderness.      Cervical back: Normal range of motion and neck supple.      Right lower leg: No edema.      Left lower leg: No edema.   Lymphadenopathy:      Cervical: No cervical adenopathy.   Skin:     General: Skin is warm and dry.      Comments: Sun exposed and sun damaged skin on face and arms     Neurological:      General: No focal deficit present.      Mental Status: He is alert and oriented to person, place, and time.      Cranial Nerves: No cranial nerve deficit.   Psychiatric:         Mood and Affect: Mood normal.         Behavior: Behavior normal.         Thought Content: Thought content normal.         Judgment: Judgment normal.     Elpidio was seen today for [facility].    Diagnoses and all orders for this visit:    Annual physical exam    Overweight (BMI 25.0-29.9)    Fatty liver: see u/s 5/21  -     Ambulatory referral/consult to Hepatology; Future    Actinic dermatitis; sunscreen, sun avoidance and hygienic measures reviewed; recommended dermatology assessment    Portion control, exercise and lifestyle modification reviewed  Curtail alcohol  Hepatology consultation  Plant based eating  Repeat labs within the next 6-12 months, sooner with problems in the interim  COVID vaccine discussed; consider Pneumovax  EKG acceptable

## 2022-07-16 ENCOUNTER — TELEPHONE (OUTPATIENT)
Dept: HEPATOLOGY | Facility: CLINIC | Age: 42
End: 2022-07-16
Payer: COMMERCIAL

## 2022-09-23 ENCOUNTER — OFFICE VISIT (OUTPATIENT)
Dept: HEPATOLOGY | Facility: CLINIC | Age: 42
End: 2022-09-23
Payer: COMMERCIAL

## 2022-09-23 ENCOUNTER — PROCEDURE VISIT (OUTPATIENT)
Dept: HEPATOLOGY | Facility: CLINIC | Age: 42
End: 2022-09-23
Payer: COMMERCIAL

## 2022-09-23 ENCOUNTER — PATIENT MESSAGE (OUTPATIENT)
Dept: HEPATOLOGY | Facility: CLINIC | Age: 42
End: 2022-09-23

## 2022-09-23 VITALS
WEIGHT: 205.25 LBS | DIASTOLIC BLOOD PRESSURE: 82 MMHG | HEIGHT: 70 IN | HEART RATE: 65 BPM | RESPIRATION RATE: 18 BRPM | BODY MASS INDEX: 29.38 KG/M2 | TEMPERATURE: 98 F | OXYGEN SATURATION: 97 % | SYSTOLIC BLOOD PRESSURE: 139 MMHG

## 2022-09-23 DIAGNOSIS — K76.0 FATTY LIVER: Primary | ICD-10-CM

## 2022-09-23 DIAGNOSIS — E66.3 OVERWEIGHT (BMI 25.0-29.9): ICD-10-CM

## 2022-09-23 DIAGNOSIS — R74.8 ELEVATED LIVER ENZYMES: ICD-10-CM

## 2022-09-23 DIAGNOSIS — E78.2 MIXED HYPERLIPIDEMIA: ICD-10-CM

## 2022-09-23 DIAGNOSIS — K76.0 FATTY LIVER: ICD-10-CM

## 2022-09-23 PROCEDURE — 99204 OFFICE O/P NEW MOD 45 MIN: CPT | Mod: S$GLB,,, | Performed by: NURSE PRACTITIONER

## 2022-09-23 PROCEDURE — 99999 PR PBB SHADOW E&M-EST. PATIENT-LVL IV: CPT | Mod: PBBFAC,,, | Performed by: NURSE PRACTITIONER

## 2022-09-23 PROCEDURE — 99999 PR PBB SHADOW E&M-EST. PATIENT-LVL IV: ICD-10-PCS | Mod: PBBFAC,,, | Performed by: NURSE PRACTITIONER

## 2022-09-23 PROCEDURE — 91200 FIBROSCAN NEW ORLEANS (VIBRATION CONTROLLED TRANSIENT ELASTOGRAPHY): ICD-10-PCS | Mod: S$GLB,,, | Performed by: NURSE PRACTITIONER

## 2022-09-23 PROCEDURE — 99204 PR OFFICE/OUTPT VISIT, NEW, LEVL IV, 45-59 MIN: ICD-10-PCS | Mod: S$GLB,,, | Performed by: NURSE PRACTITIONER

## 2022-09-23 PROCEDURE — 91200 LIVER ELASTOGRAPHY: CPT | Mod: S$GLB,,, | Performed by: NURSE PRACTITIONER

## 2022-09-23 NOTE — PATIENT INSTRUCTIONS
Fibroscan today to assess for any liver scarring/damage from fatty liver  Labs to rule out other causes of liver problems         Fatty liver    There is no FDA approved therapy for non-alcoholic fatty liver disease (NAFLD); therefore, lifestyle changes are important:  1. Ongoing weight loss efforts  2. Low carb/sugar, high protein diet.   3. Exercise, 5 days per week, 30 minutes per day, as tolerated  4. Recommend good control of cholesterol, blood pressure, blood sugar levels (as these are risk factors for fatty liver). Cholesterol lowering medications including statins are typically safe and beneficial (even if liver enzymes are elevated).    5. Limit alcohol consumption, no more than 2 serving(s) of alcohol in any day (1 serving is 5 ounces of wine, 12 ounces of beer, or 1.5 ounces of liquor). Do not recommend daily alcohol use.      In some people, fatty liver can progress to steatohepatitis (inflamatory fatty liver) and possibly to cirrhosis, putting one at increased risk for liver cancer and liver failure. Lifestyle changes can help to decrease this risk.     Ask about our fatty liver/NAGEL clinical trials if you have fibrosis / scar tissue related to fatty liver.        Additional Resources:    Websites with information about fatty liver and inflammation related to fatty liver (NAGEL): www.nashtruth.LeadiD and www.nashactually.com   [facility]: Non-Alcoholic Fatty Liver Disease (NAFLD)    Facebook support group with tips and recipes:   Non-Alcoholic Fatty Liver Disease (NAFLD) Diet & Nutrition Support     Can download MyFitness Pal or [employer] It david to add up your carbohydrates throughout the day.   Tip -- Avoid beverages with calories or carbohydrates.   Try www.Nettle.LeadiD for recipes.    If interested in seeing a dietician to create a weight loss plan, contact the dietician team at [facility]: [email or web address].  You can also call to schedule a consult with a dietician: [phone]  *Virtual  visits are available with one of our dieticians.     If you have diabetes or high blood pressure, you can meet with a Health  through [facility] program. Let us know if you are interested in a referral.     Let us know if you are interested in a referral to [facility] program.             Snacks: (100-200 calories; >5g protein)     Dairy combos:  - 2 wedges Laughing Cow - Light Herb & Garlic Cheese with sliced cucumber or green bell pepper  - 1/2 cup low-fat cottage cheese with ¼ cup fruit or ¼ cup salsa  - 1/2 cup low fat cottage cheese with 10-15 cherry tomatoes  - 1 low-fat cheese stick with 8 cherry tomatoes or 1 serving fresh fruit    Meat and other proteins:  - 2 slices of turkey leger  - Boiled eggs (can buy at [employer] already boiled w/ shell removed)  - Khurram Blake's Beef Steak - 14g protein! (similar to beef jerky but very lean)  - 4 thin slices fat-free turkey breast and 1 slice low-fat cheese  - 4 thin slices fat-free honey ham with wedge of melon  - For convenience,  [place] read, snack, go (deli meat and cheese rolls)  - P3 packets (Protein packs w/ cheese, nuts, lean deli meat)  - 1/4 cup unsalted nuts with ½ cup fruit  - 6-8 edamame pods (equivalent to about 1/4 cup edamame without pods)    Yogurt, puddings:  - MHP Fit and Lean Protein Pudding (find at [employer] - per 1 cup serving = 100 calories, 15 g protein, 0 g sugar)  - 6-oz container Dannon Light n' Fit vanilla yogurt, topped with 1oz unsalted nuts         - 2 Tbsp PB2 mixed in light or greek yogurt or sugar-free pudding    Fruits and vegetable combos:  - Apple, celery or baby carrots spread with 2 Tbsp PB2  - Apple slices with 1 oz slice low-fat cheese  - Apple slices dipped in 2 Tbsp of PB2  - Celery, cucumber, bell pepper or baby carrots dipped in ¼ cup hummus bean spread   - Celery, cucumber, baby carrots dipped in high protein greek yogurt (Mix 16 oz plain greek yogurt + 1 packet of [place]  "ranch dip mix)    Drinks:  - 8 oz glass of FAIRLIFE fat free milk (13 g protein)  - 8 oz glass of FAIRLIFE fat free milk + 1 packet of sugar-free hot cocoa  - Add Atkins advantage Cafe Caramel shake to decaf coffee. Serve hot or blend with ice for "frappaccino" like drink    PROTEIN:  - Protein Shakes: Atkins, Low Carb Slim Fast, EAS light, Muscle Milk Light, Iconic etc.  - Protein Powder: [employer]NC Soy95, Isopure, Nectar, UNJURY, Whey Gourmet, etc. Mix 1 scoop powder with 8oz skim/1% milk or light soymilk.  - Protein Bars: Atkins, EAS, Pure Protein,  Quest, Think Thin, Detour, etc. Must have 0-4 grams sugar - Read the label.    "

## 2022-09-23 NOTE — PROGRESS NOTES
[facility] - New Patient     REFERRING PROVIDER: Dr. Susan Preciado  PCP: Susan Preciado MD    Chief Complaint: Fatty liver, elevated liver enzymes      HISTORY     This is a 41 y.o. male referred for evaluation of fatty liver and elevated liver enzymes.    Fatty liver first noted on abd US 5/2021. Imaging with mildly enlarged liver and spleen.    Review of labs:  - Transaminases: elevated sine 2017, mainly ALT, 40s-70s  - Alk phos: WNL-low  - Synthetic liver function: WNL  - Platelets: WNL    Workup thus far:  Serologies:   Lab Results   Component Value Date    HEPBSAG Negative 05/04/2021    HEPBIGM Negative 05/04/2021    HEPCAB Negative 05/04/2021    HEPAIGM Negative 05/04/2021       Risk factors for fatty liver:   Weight -- Body mass index is 29.45 kg/m².     Weight trending up over the last few years                     Dyslipidemia -- yes                               Insulin resistance / diabetes -- no, no though HgbA1c for review        Hypertension -- no  Alcohol use -- weekends, case of beer over the course of weekend    Family history:  Dad has fatty liver     Meds:  -Rx: none at this time  -OTC, herbs/supplements: n/a  No recent medication changes.     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    Feels well overall.  No s/s hepatic decompensation.        Past medical history, surgical history, problem list, family history, social history, allergies: Reviewed and updated in the appropriate section of the electronic medical record.      No current outpatient medications on file.     No current facility-administered medications for this visit.     Medication list reviewed and updated.      Review of Systems   Constitutional: Negative for fatigue or unexpected weight change.   Respiratory: Negative for shortness of breath.    Cardiovascular: Negative for leg swelling.  Gastrointestinal: Negative for abdominal distention, abdominal pain,  "diarrhea, nausea, and vomiting. Negative for blood in stool, melena, or hematemesis.  Musculoskeletal: Negative for myalgias.    Skin: Negative for itching.  Neurological: Negative for dizziness or tremors. Negative for confusion, slowed mentation, or memory loss.   Hematological: Does not bruise/bleed easily.   Psychiatric: Negative for mood changes or sleep disturbance.      Physical Exam   Constitutional: Well-nourished. No distress. Alert and oriented.  Eyes: No scleral icterus.   Pulmonary/Chest: Respiratory effort normal. No respiratory distress.   Abdominal: No distension, no ascites appreciated.   Extremities: No edema.   Neurological: No tremor or asterixis. Gait normal.  Skin: No jaundice. No spider telangiectasias or palmar erythema.  Psychiatric: Normal mood and affect. Speech, behavior, and thought content normal. No depression or anxiety noted.       Vitals reviewed.  /82 (BP Location: Right arm, Patient Position: Sitting, BP Method: Large (Automatic))   Pulse 65   Temp 98.2 °F (36.8 °C) (Oral)   Resp 18   Ht 5' 10" (1.778 m)   Wt 93.1 kg (205 lb 4 oz)   SpO2 97%   BMI 29.45 kg/m²         LABS & DIAGNOSTIC STUDIES     I have personally reviewed pertinent laboratory findings:    Lab Results   Component Value Date    ALT 63 (H) 07/15/2022    AST 32 07/15/2022    ALKPHOS 46 (L) 07/15/2022    BILITOT 0.8 07/15/2022    ALBUMIN 4.2 07/15/2022       Lab Results   Component Value Date    WBC 5.74 07/15/2022    HGB 15.6 07/15/2022    HCT 45.5 07/15/2022    MCV 94 07/15/2022     07/15/2022       Lab Results   Component Value Date     07/15/2022    K 4.2 07/15/2022    BUN 13 07/15/2022    CREATININE 1.0 07/15/2022    ESTGFRAFRICA >60.0 07/15/2022    EGFRNONAA >60.0 07/15/2022       Lab Results   Component Value Date    HEPBSAG Negative 05/04/2021    HEPBIGM Negative 05/04/2021    HEPCAB Negative 05/04/2021    HEPAIGM Negative 05/04/2021    [id number] Negative 06/07/2017       No results " found for: AFP    I have personally reviewed the following result reports:  Abdominal US - 5/19/21          ASSESSMENT & PLAN     41 y.o. male with:    1. Fatty liver, elevated liver enzymes  -- Obtain Fibroscan for fibrosis and steatosis staging  -- Elevated liver enzymes are likely due to fatty liver though will complete serologic workup to rule out other causes of liver disease. Anticipate improvement in liver enzymes with weight loss and decreasing alcohol.    Fatty liver discussion:  - Reviewed risk of hepatic inflammation and subsequent fibrosis from fatty liver.  - At this time, the only treatment for fatty liver is weight loss and maintaining good control of metabolic risk factors (blood pressure, cholesterol, and blood sugar).   - Alcohol use is also a risk factor for fatty liver. If no advanced fibrosis, should limit alcohol to max 2 standard drinks/sitting. Do not recommend daily alcohol use or drinking alcohol most days per week.     2. Body mass index is 29.45 kg/m²., HLD  -- Reviewed weight loss strategies including dietary changes and physical activity. Discussed low carbohydrate, low sugar diet. Recommend weight loss goal of 10% (about 20 lb).   -- We discussed additional weight loss resources including dietician consult and [facility] program.        Orders Placed This Encounter   Procedures    FibroScan [place] (Vibration Controlled Transient Elastography)    Alpha-1-Antitrypsin    Ceruloplasmin    MICHAELLE Screen w/Reflex    Anti-Smooth Muscle Antibody    IgG    Ferritin    Iron and TIBC    Hepatitis A antibody, IgG    Hepatitis B Core Antibody, Total    Hepatitis B Surface Ab, Qualitative    Hemoglobin A1C       *See AVS for patient education and instructions.       Return to clinic pending above results.      Thank you for allowing me to participate in the care of [facility]        Duration of encounter: 45 min  This includes face to face  time and non-face to face time preparing to see the patient (eg, review of tests), obtaining and/or reviewing separately obtained history, documenting clinical information in the electronic or other health record, independently interpreting results and communicating results to the patient/family/caregiver, or Care coordination.

## 2022-09-23 NOTE — PROCEDURES
FibroScan [place] (Vibration Controlled Transient Elastography)    Date/Time: 9/23/2022 11:45 AM  Performed by: Carmencita Centeno NP  Authorized by: Carmencita Centeno NP     Diagnosis:  NAFLD    Probe:  M    Universal Protocol: Patient's identity, procedure and site were verified, confirmatory pause was performed.  Discussed procedure including risks and potential complications.  Questions answered.  Patient verbalizes understanding and wishes to proceed with VCTE.     Procedure: After providing explanations of the procedure, patient was placed in the supine position with right arm in maximum abduction to allow optimal exposure of right lateral abdomen.  Patient was briefly assessed, Testing was performed in the mid-axillary location, 50Hz Shear Wave pulses were applied and the resulting Shear Wave and Propagation Speed detected with a 3.5 MHz ultrasonic signal, using the FibroScan probe, Skin to liver capsule distance and liver parenchyma were accessed during the entire examination with the FibroScan probe, Patient was instructed to breathe normally and to abstain from sudden movements during the procedure, allowing for random measurements of liver stiffness. At least 10 Shear Waves were produced, Individual measurements of each Shear Wave were calculated.  Patient tolerated the procedure well with no complications.  Meets discharge criteria as was dismissed.  Rates pain 0 out of 10.  Patient will follow up with ordering provider to review results.    Findings  Median liver stiffness score:  6.3  CAP Reading: dB/m:  295    IQR/med %:  6  Interpretation  Fibrosis interpretation is based on medial liver stiffness - Kilopascal (kPa).    Fibrosis Stage:  F 0-1  Steatosis interpretation is based on controlled attenuation parameter - (dB/m).    Steatosis Grade:  S3

## 2022-10-03 ENCOUNTER — PATIENT MESSAGE (OUTPATIENT)
Dept: HEPATOLOGY | Facility: CLINIC | Age: 42
End: 2022-10-03
Payer: COMMERCIAL

## 2022-10-04 ENCOUNTER — TELEPHONE (OUTPATIENT)
Dept: HEPATOLOGY | Facility: CLINIC | Age: 42
End: 2022-10-04
Payer: COMMERCIAL

## 2022-10-04 DIAGNOSIS — E88.01 LOW PLASMA ALPHA-1 ANTITRYPSIN: ICD-10-CM

## 2022-10-04 DIAGNOSIS — R74.8 ELEVATED LIVER ENZYMES: Primary | ICD-10-CM

## 2023-03-26 ENCOUNTER — HOSPITAL ENCOUNTER (EMERGENCY)
Facility: HOSPITAL | Age: 43
Discharge: HOME | End: 2023-03-26
Attending: STUDENT IN AN ORGANIZED HEALTH CARE EDUCATION/TRAINING PROGRAM
Payer: COMMERCIAL

## 2023-03-26 VITALS
BODY MASS INDEX: 29.61 KG/M2 | TEMPERATURE: 97 F | HEART RATE: 53 BPM | DIASTOLIC BLOOD PRESSURE: 88 MMHG | RESPIRATION RATE: 18 BRPM | SYSTOLIC BLOOD PRESSURE: 130 MMHG | WEIGHT: 206.38 LBS | OXYGEN SATURATION: 99 %

## 2023-03-26 DIAGNOSIS — R10.9 RIGHT FLANK PAIN: Primary | ICD-10-CM

## 2023-03-26 LAB
BILIRUB UR QL STRIP: NEGATIVE
CLARITY UR: CLEAR
COLOR UR: YELLOW
GLUCOSE UR QL STRIP: NEGATIVE
HGB UR QL STRIP: NEGATIVE
KETONES UR QL STRIP: NEGATIVE
LEUKOCYTE ESTERASE UR QL STRIP: NEGATIVE
NITRITE UR QL STRIP: NEGATIVE
PH UR STRIP: 6 [PH] (ref 5–8)
PROT UR QL STRIP: NEGATIVE
SP GR UR STRIP: >=1.03 (ref 1–1.03)
URN SPEC COLLECT METH UR: ABNORMAL
UROBILINOGEN UR STRIP-ACNC: NEGATIVE EU/DL

## 2023-03-26 PROCEDURE — 25000003 PHARM REV CODE 250: Performed by: STUDENT IN AN ORGANIZED HEALTH CARE EDUCATION/TRAINING PROGRAM

## 2023-03-26 PROCEDURE — 81003 URINALYSIS AUTO W/O SCOPE: CPT | Performed by: STUDENT IN AN ORGANIZED HEALTH CARE EDUCATION/TRAINING PROGRAM

## 2023-03-26 PROCEDURE — 99285 EMERGENCY DEPT VISIT HI MDM: CPT | Mod: 25

## 2023-03-26 PROCEDURE — 96372 THER/PROPH/DIAG INJ SC/IM: CPT | Performed by: STUDENT IN AN ORGANIZED HEALTH CARE EDUCATION/TRAINING PROGRAM

## 2023-03-26 PROCEDURE — 63600175 PHARM REV CODE 636 W HCPCS: Performed by: STUDENT IN AN ORGANIZED HEALTH CARE EDUCATION/TRAINING PROGRAM

## 2023-03-26 RX ORDER — CYCLOBENZAPRINE HCL 10 MG
10 TABLET ORAL 3 TIMES DAILY PRN
Qty: 15 TABLET | Refills: 0 | Status: SHIPPED | OUTPATIENT
Start: 2023-03-26 | End: 2023-03-31

## 2023-03-26 RX ORDER — KETOROLAC TROMETHAMINE 30 MG/ML
15 INJECTION, SOLUTION INTRAMUSCULAR; INTRAVENOUS
Status: COMPLETED | OUTPATIENT
Start: 2023-03-26 | End: 2023-03-26

## 2023-03-26 RX ORDER — LIDOCAINE 50 MG/G
1 PATCH TOPICAL DAILY
Qty: 10 PATCH | Refills: 0 | Status: SHIPPED | OUTPATIENT
Start: 2023-03-26 | End: 2023-08-18

## 2023-03-26 RX ORDER — CYCLOBENZAPRINE HCL 10 MG
10 TABLET ORAL
Status: COMPLETED | OUTPATIENT
Start: 2023-03-26 | End: 2023-03-26

## 2023-03-26 RX ADMIN — KETOROLAC TROMETHAMINE 15 MG: 30 INJECTION, SOLUTION INTRAMUSCULAR; INTRAVENOUS at 07:03

## 2023-03-26 RX ADMIN — CYCLOBENZAPRINE HYDROCHLORIDE 10 MG: 10 TABLET, FILM COATED ORAL at 08:03

## 2023-03-26 NOTE — DISCHARGE INSTRUCTIONS
Please follow up with your primary care physician within 2 days. Ensure that you review all lab work results and/or imaging results. If you have any questions about your discharge paperwork please call the Emergency Department.     Return to the Emergency Department for any numbness, tingling, weakness, worsening pain, fever, numbness to your groin, urinary or bowel incontinence or retention, or any new or worsening symptoms.       If you do begin taking the antibiotics, please take them to completion. If you were prescribed antibiotics, please take them to completion. If you were prescribed a narcotic or any sedating medication, do not drive or operate heavy equipment or machinery while taking these medications.    Thank you for visiting [facility], Department of Emergency Medicine. Please see the entirety of the educational materials provided. Please note that a visit to the emergency department does not substitute ongoing care from a primary medical provider or specialist. Please ensure to follow up as recommended. However, please return to the emergency department immediately if symptoms do not improve as discussed, symptoms worsen, new symptoms develop, difficulty in following up or for any of your concerns or issues. Please note on discharge you are acknowledging understanding and agreement on medical evaluation, management recommendations and follow up recommendations.

## 2023-03-26 NOTE — ED PROVIDER NOTES
Encounter Date: 3/26/2023       History     Chief Complaint   Patient presents with    Flank Pain     Patient to ER CC of right flank pain that radiates to his right abd for a week, denies trouble urinating      42-year-old male with history of hyperlipidemia and elevated liver enzymes, presenting with right flank pain.  Patient reports that pain began one week ago after working out at the gym.  Reports pain is constant, but has now radiated around to the front.  Does report localized pain over a right lower rib.  No dysuria or hematuria.  No fever, nausea, vomiting.  Does report 2-3 episodes of diarrhea.  No other complaints.    Review of patient's allergies indicates:  No Known Allergies  Past Medical History:   Diagnosis Date    Abscess of back 01/12/2018    Hyperlipidemia      Past Surgical History:   Procedure Laterality Date    FEMUR FRACTURE SURGERY  2000    HERNIA REPAIR       Family History   Problem Relation Age of Onset    Heart disease Father         A fib    Cancer Father         skin    Skin cancer Father     No Known Problems Sister     No Known Problems Brother     Heart disease Maternal Grandmother         CHF    Cancer Maternal Grandfather         prostate vs rectal    Cancer Paternal Grandfather         bone marrow?    Cirrhosis Other      Social History     Tobacco Use    Smoking status: Never    Smokeless tobacco: Never   Substance Use Topics    Alcohol use: Yes     Alcohol/week: 3.0 standard drinks     Types: 3 Cans of beer per week    Drug use: No     Review of Systems   Constitutional:  Negative for fever.   HENT:  Negative for sore throat.    Respiratory:  Negative for shortness of breath.    Cardiovascular:  Negative for chest pain.   Gastrointestinal:  Positive for abdominal pain and diarrhea. Negative for nausea and vomiting.   Genitourinary:  Positive for flank pain. Negative for dysuria and hematuria.   Musculoskeletal:  Negative for back pain.   Skin:  Negative for rash.    Neurological:  Negative for weakness.   Hematological:  Does not bruise/bleed easily.     Physical Exam     Initial Vitals [03/26/23 0653]   BP Pulse Resp Temp SpO2   130/88 (!) 53 18 97 °F (36.1 °C) 99 %      MAP       --         Physical Exam    Nursing note and vitals reviewed.  Constitutional: He appears well-developed.   Eyes: EOM are normal.   Neck:   Normal range of motion.  Cardiovascular:            No murmur heard.  Pulmonary/Chest: No respiratory distress.   Abdominal: He exhibits no distension.   No TTP diffusely. No guarding, rebound, or masses. Negative Giang's sign. No TTP at McBurney's point. No CVAT bilaterally.     Musculoskeletal:         General: Normal range of motion.      Cervical back: Normal range of motion.     Neurological: He is alert.   Skin: Skin is warm.   Psychiatric: He has a normal mood and affect.       ED Course   Procedures  Labs Reviewed   URINALYSIS, REFLEX TO URINE CULTURE - Abnormal; Notable for the following components:       Result Value    Specific Gravity, UA >=1.030 (*)     All other components within normal limits    Narrative:     Specimen Source->Urine          Imaging Results              CT Renal Stone Study ABD Pelvis WO (Final result)  Result time 03/26/23 08:46:26      Final result by Carol Perla MD (03/26/23 08:46:26)                   Impression:      Tiny nonobstructing left renal calculus.  No ureteral calculus or obstruction.    Fatty infiltration of the liver.  Tiny fat-containing umbilical hernia.      Electronically signed by: Carol Perla  Date:    03/26/2023  Time:    08:46               Narrative:    EXAMINATION:  CT RENAL STONE STUDY ABD PELVIS WO    CLINICAL HISTORY:  Flank pain, kidney stone suspected;    TECHNIQUE:  Low dose axial images, sagittal and coronal reformations were obtained from the lung bases to the pubic symphysis.  Contrast was not administered.    COMPARISON:  Ultrasound 05/19/2021    FINDINGS:  Visualized portions  of the lung bases are clear.    There is fatty infiltration of the liver.    The gallbladder, spleen, adrenal glands, pancreas are grossly normal.  There is a 2 mm nonobstructing left lower pole renal calculus.  No right renal calculi are present.  There is no ureteral calculus or hydronephrosis.  The aorta is normal in caliber.    Urinary bladder unremarkable.  The bowel is unopacified and grossly unremarkable with no dilated or inflamed loops seen.    There is a tiny fat-containing umbilical hernia.    There is no ascites.  There is hardware in the right femur.                                       Medications   ketorolac injection 15 mg (15 mg Intramuscular Given 3/26/23 0702)   cyclobenzaprine tablet 10 mg (10 mg Oral Given 3/26/23 0852)     Medical Decision Making:   Differential Diagnosis:   DDX: Possible kidney stone. R/o UTI/pyelonephritis, infected stone. Less likely appendicitis/diverticulitis given benign abdomen, but will be screened. Otherwise likely muscular strain.  DX: UA. CTAP w/o contrast. Consider labs if large stone to assess for [place].  TX: Analgesia, antiemetic PRN. IVF. Treatment/consult as indicated by studies.  DISPO: Pending studies, reassessment. If studies WNL or stable for outpatient management, symptoms controlled, discharge to follow up with PMD +/- urology within 1 week with supportive care recommendations and RTED precautions.               ED Course as of 03/26/23 1006   Sun Mar 26, 2023   0859 No kidney stone.  Patient states that pain began after gym session.  Likely muscular.  Given return precautions. [NB]      ED Course User Index  [NB] Lico Dominguez MD                 Clinical Impression:   Final diagnoses:  [R10.9] Right flank pain (Primary)        ED Disposition Condition    Discharge Stable          ED Prescriptions       Medication Sig Dispense Start Date End Date Auth. Provider    LIDOcaine (LIDODERM) 5 % Place 1 patch onto the skin once daily. Remove & Discard patch  within 12 hours or as directed by MD 10 patch 3/26/2023 -- Lico Dominguez MD    cyclobenzaprine (FLEXERIL) 10 MG tablet Take 1 tablet (10 mg total) by mouth 3 (three) times daily as needed for Muscle spasms. 15 tablet 3/26/2023 3/31/2023 Lico Dominguez MD          Follow-up Information       Follow up With Specialties Details Why Contact Info    Susan Preciado MD Internal Medicine Schedule an appointment as soon as possible for a visit in 2 days  [address]  [place] [address]  [phone]      [facility] - Emergency Dept Emergency Medicine  If symptoms worsen [address] [id number] [phone]             Lico Dominguez MD  03/26/23 0657       Lico Dominguez MD  03/26/23 100

## 2023-06-13 DIAGNOSIS — Z00.00 ROUTINE GENERAL MEDICAL EXAMINATION AT A HEALTH CARE FACILITY: Primary | ICD-10-CM

## 2023-08-18 ENCOUNTER — HOSPITAL ENCOUNTER (OUTPATIENT)
Dept: RADIOLOGY | Facility: HOSPITAL | Age: 43
Discharge: HOME | End: 2023-08-18
Attending: FAMILY MEDICINE
Payer: COMMERCIAL

## 2023-08-18 ENCOUNTER — CLINICAL SUPPORT (OUTPATIENT)
Dept: INTERNAL MEDICINE | Facility: CLINIC | Age: 43
End: 2023-08-18
Payer: COMMERCIAL

## 2023-08-18 ENCOUNTER — OFFICE VISIT (OUTPATIENT)
Dept: INTERNAL MEDICINE | Facility: CLINIC | Age: 43
End: 2023-08-18
Payer: COMMERCIAL

## 2023-08-18 ENCOUNTER — HOSPITAL ENCOUNTER (OUTPATIENT)
Dept: CARDIOLOGY | Facility: CLINIC | Age: 43
Discharge: HOME | End: 2023-08-18
Payer: COMMERCIAL

## 2023-08-18 VITALS
SYSTOLIC BLOOD PRESSURE: 122 MMHG | HEIGHT: 70 IN | BODY MASS INDEX: 29.92 KG/M2 | WEIGHT: 209 LBS | DIASTOLIC BLOOD PRESSURE: 84 MMHG | OXYGEN SATURATION: 98 % | HEART RATE: 67 BPM

## 2023-08-18 DIAGNOSIS — Z00.00 ROUTINE GENERAL MEDICAL EXAMINATION AT A HEALTH CARE FACILITY: ICD-10-CM

## 2023-08-18 DIAGNOSIS — E66.3 OVERWEIGHT (BMI 25.0-29.9): ICD-10-CM

## 2023-08-18 DIAGNOSIS — Z00.00 ANNUAL PHYSICAL EXAM: Primary | ICD-10-CM

## 2023-08-18 DIAGNOSIS — K76.0 FATTY LIVER: ICD-10-CM

## 2023-08-18 PROBLEM — R74.01 ELEVATED ALT MEASUREMENT: Status: RESOLVED | Noted: 2021-03-22 | Resolved: 2023-08-18

## 2023-08-18 LAB
ALBUMIN SERPL BCP-MCNC: 4.3 G/DL (ref 3.5–5.2)
ALP SERPL-CCNC: 49 U/L (ref 55–135)
ALT SERPL W/O P-5'-P-CCNC: 41 U/L (ref 10–44)
ANION GAP SERPL CALC-SCNC: 9 MMOL/L (ref 8–16)
AST SERPL-CCNC: 22 U/L (ref 10–40)
BILIRUB SERPL-MCNC: 0.6 MG/DL (ref 0.1–1)
BUN SERPL-MCNC: 14 MG/DL (ref 6–20)
CALCIUM SERPL-MCNC: 9.4 MG/DL (ref 8.7–10.5)
CHLORIDE SERPL-SCNC: 106 MMOL/L (ref 95–110)
CHOLEST SERPL-MCNC: 191 MG/DL (ref 120–199)
CHOLEST/HDLC SERPL: 4.2 {RATIO} (ref 2–5)
CO2 SERPL-SCNC: 25 MMOL/L (ref 23–29)
COMPLEXED PSA SERPL-MCNC: 0.35 NG/ML (ref 0–4)
CREAT SERPL-MCNC: 1.1 MG/DL (ref 0.5–1.4)
ERYTHROCYTE [DISTWIDTH] IN BLOOD BY AUTOMATED COUNT: 12.1 % (ref 11.5–14.5)
EST. GFR  (NO RACE VARIABLE): >60 ML/MIN/1.73 M^2
ESTIMATED AVG GLUCOSE: 103 MG/DL (ref 68–131)
GLUCOSE SERPL-MCNC: 89 MG/DL (ref 70–110)
HBA1C MFR BLD: 5.2 % (ref 4–5.6)
HCT VFR BLD AUTO: 48.8 % (ref 40–54)
HDLC SERPL-MCNC: 46 MG/DL (ref 40–75)
HDLC SERPL: 24.1 % (ref 20–50)
HGB BLD-MCNC: 16.6 G/DL (ref 14–18)
LDLC SERPL CALC-MCNC: 125.8 MG/DL (ref 63–159)
MCH RBC QN AUTO: 30.6 PG (ref 27–31)
MCHC RBC AUTO-ENTMCNC: 34 G/DL (ref 32–36)
MCV RBC AUTO: 90 FL (ref 82–98)
NONHDLC SERPL-MCNC: 145 MG/DL
PLATELET # BLD AUTO: 243 K/UL (ref 150–450)
PMV BLD AUTO: 10.6 FL (ref 9.2–12.9)
POTASSIUM SERPL-SCNC: 4.3 MMOL/L (ref 3.5–5.1)
PROT SERPL-MCNC: 7 G/DL (ref 6–8.4)
RBC # BLD AUTO: 5.42 M/UL (ref 4.6–6.2)
SODIUM SERPL-SCNC: 140 MMOL/L (ref 136–145)
TRIGL SERPL-MCNC: 96 MG/DL (ref 30–150)
TSH SERPL DL<=0.005 MIU/L-ACNC: 1.96 UIU/ML (ref 0.4–4)
WBC # BLD AUTO: 6.1 K/UL (ref 3.9–12.7)

## 2023-08-18 PROCEDURE — 93010 EKG 12-LEAD: ICD-10-PCS | Mod: S$GLB,,, | Performed by: INTERNAL MEDICINE

## 2023-08-18 PROCEDURE — 99999 PR PBB SHADOW E&M-EST. PATIENT-LVL III: CPT | Mod: PBBFAC,,, | Performed by: FAMILY MEDICINE

## 2023-08-18 PROCEDURE — 93010 ELECTROCARDIOGRAM REPORT: CPT | Mod: S$GLB,,, | Performed by: INTERNAL MEDICINE

## 2023-08-18 PROCEDURE — 84443 ASSAY THYROID STIM HORMONE: CPT | Performed by: FAMILY MEDICINE

## 2023-08-18 PROCEDURE — 80053 COMPREHEN METABOLIC PANEL: CPT | Performed by: FAMILY MEDICINE

## 2023-08-18 PROCEDURE — 83036 HEMOGLOBIN GLYCOSYLATED A1C: CPT | Performed by: FAMILY MEDICINE

## 2023-08-18 PROCEDURE — 93005 ELECTROCARDIOGRAM TRACING: CPT | Mod: S$GLB,,, | Performed by: FAMILY MEDICINE

## 2023-08-18 PROCEDURE — 85027 COMPLETE CBC AUTOMATED: CPT | Performed by: FAMILY MEDICINE

## 2023-08-18 PROCEDURE — 99999 PR PBB SHADOW E&M-EST. PATIENT-LVL III: ICD-10-PCS | Mod: PBBFAC,,, | Performed by: FAMILY MEDICINE

## 2023-08-18 PROCEDURE — 71046 X-RAY EXAM CHEST 2 VIEWS: CPT | Mod: 26,,, | Performed by: RADIOLOGY

## 2023-08-18 PROCEDURE — 71046 X-RAY EXAM CHEST 2 VIEWS: CPT | Mod: TC,FY

## 2023-08-18 PROCEDURE — 80061 LIPID PANEL: CPT | Performed by: FAMILY MEDICINE

## 2023-08-18 PROCEDURE — 71046 XR CHEST PA AND LATERAL: ICD-10-PCS | Mod: 26,,, | Performed by: RADIOLOGY

## 2023-08-18 PROCEDURE — 93005 EKG 12-LEAD: ICD-10-PCS | Mod: S$GLB,,, | Performed by: FAMILY MEDICINE

## 2023-08-18 PROCEDURE — 99999 PR PBB SHADOW E&M-EST. PATIENT-LVL I: ICD-10-PCS | Mod: PBBFAC,,,

## 2023-08-18 PROCEDURE — 84153 ASSAY OF PSA TOTAL: CPT | Performed by: FAMILY MEDICINE

## 2023-08-18 PROCEDURE — 99999 PR PBB SHADOW E&M-EST. PATIENT-LVL I: CPT | Mod: PBBFAC,,,

## 2023-08-18 PROCEDURE — 99396 PR PREVENTIVE VISIT,EST,40-64: ICD-10-PCS | Mod: S$GLB,,, | Performed by: FAMILY MEDICINE

## 2023-08-18 PROCEDURE — 99396 PREV VISIT EST AGE 40-64: CPT | Mod: S$GLB,,, | Performed by: FAMILY MEDICINE

## 2023-08-18 NOTE — PROGRESS NOTES
"Subjective:       Patient ID: Elpidio Mooney is a 42 y.o. male.    Chief Complaint: Executive Health    HPI    Elpidio Mooney is a 42 y.o. male for [facility] health physical.     Labs, xray prior, review.    #Hep: Fatty liver, Elev LFTs  - est w/ NP Jacobo, lv 9/2022  - fibroscan 9/2022    #BMI 29    Review of Systems   Constitutional:  Negative for chills, fatigue and fever.   HENT:  Negative for congestion.    Respiratory:  Negative for cough and shortness of breath.    Cardiovascular:  Negative for chest pain and palpitations.   Gastrointestinal:  Negative for abdominal pain, constipation, diarrhea, nausea and vomiting.   Genitourinary:  Negative for dysuria and hematuria.   Musculoskeletal:  Negative for back pain.   Skin:  Negative for rash.   Neurological:  Negative for weakness, numbness and headaches.         Past Medical History:   Diagnosis Date    Abscess of back 01/12/2018    Elevated ALT measurement 3/22/2021    Hyperlipidemia         Prior to Admission medications    Medication Sig Start Date End Date Taking? Authorizing Provider   LIDOcaine (LIDODERM) 5 % Place 1 patch onto the skin once daily. Remove & Discard patch within 12 hours or as directed by MD 3/26/23   Lico Dominguez MD        Past medical history, surgical history, and family medical history reviewed and updated as appropriate.    Medications and allergies reviewed.     Objective:          Vitals:    08/18/23 0948   BP: 122/84   BP Location: Right arm   Patient Position: Sitting   Pulse: 67   SpO2: 98%   Weight: 94.8 kg (208 lb 15.9 oz)   Height: 5' 10" (1.778 m)     Body mass index is 29.99 kg/m².  Physical Exam  Vitals and nursing note reviewed.   Constitutional:       General: He is not in acute distress.     Appearance: Normal appearance. He is well-developed.   Eyes:      Extraocular Movements: Extraocular movements intact.   Cardiovascular:      Rate and Rhythm: Normal rate and regular rhythm.      Pulses: Normal pulses.      Heart " sounds: Normal heart sounds. No murmur heard.  Pulmonary:      Effort: Pulmonary effort is normal. No respiratory distress.      Breath sounds: Normal breath sounds. No wheezing.   Neurological:      Mental Status: He is alert and oriented to person, place, and time.   Psychiatric:         Mood and Affect: Mood normal.         Behavior: Behavior normal.         Lab Results   Component Value Date    WBC 6.10 08/18/2023    HGB 16.6 08/18/2023    HCT 48.8 08/18/2023     08/18/2023    CHOL 191 08/18/2023    TRIG 96 08/18/2023    HDL 46 08/18/2023    ALT 41 08/18/2023    AST 22 08/18/2023     08/18/2023    K 4.3 08/18/2023     08/18/2023    CREATININE 1.1 08/18/2023    BUN 14 08/18/2023    CO2 25 08/18/2023    TSH 1.962 08/18/2023    PSA 0.35 08/18/2023    HGBA1C 5.2 08/18/2023       Assessment:       1. Annual physical exam    2. Fatty liver    3. Overweight (BMI 25.0-29.9)          Plan:   1. Annual physical exam    2. Fatty liver    3. Overweight (BMI 25.0-29.9)        Health maintenance reviewed with patient.     No follow-ups on file.    Darrell Rao MD  Family Medicine / Primary Care  [facility]  8/18/2023

## 2023-08-18 NOTE — LETTER
"August 18, 2023    Elpidio Mooney  [address]  [place] [address]      Dear Elpidio Mooney,    On 8/18/2023, it was a pleasure to see you and perform your Executive Health evaluation. Your Primary Care physician is Darrell Rao MD. At the time of this visit, you are 42 y.o..  You denied any specific complaints or concerns during todays visit.        YOUR PAST MEDICAL HISTORY:  has a past medical history of Abscess of back, Elevated ALT measurement, and Hyperlipidemia..    YOUR PAST SURGICAL HISTORY:  has a past surgical history that includes Hernia repair; Femur fracture surgery (01/01/2000); Fracture surgery (07/2002); and Vasectomy (2010)..    YOUR CURRENT MEDICATIONS:   Current Outpatient Medications:     multivitamin capsule, Take 1 capsule by mouth once daily. Shaylee multivitamin, Disp: , Rfl: .    YOUR KNOWN DRUG ALLERGIES:  has No Known Allergies.    YOUR SOCIAL HISTORY:  reports that he has quit smoking. His smoking use included cigarettes. He has never used smokeless tobacco. He reports current alcohol use of about 10.0 standard drinks of alcohol per week. He reports that he does not use drugs..    YOUR FAMILY HISTORY: family history includes Cancer in his maternal grandfather and paternal grandfather; Cirrhosis in an other family member; Diabetes in his maternal grandmother; Heart disease in his father and maternal grandmother; Mental retardation in his maternal uncle; No Known Problems in his brother and sister; Skin cancer in his father; Stroke in his maternal grandmother..     YOUR ROUTINE HEALTH MAINTENANCE includes   Health Maintenance   Topic Date Due    TETANUS VACCINE  02/26/2024    Lipid Panel  08/18/2028    Hepatitis C Screening  Completed   .    PHYSICAL EXAMINATION:  Vitals:    08/18/23 0948   BP: 122/84   BP Location: Right arm   Patient Position: Sitting   Pulse: 67   SpO2: 98%   Weight: 94.8 kg (208 lb 15.9 oz)   Height: 5' 10" (1.778 m)       These are normal vital signs. Your " physical examination did not reveal any additional significant abnormal findings.    YOUR BLOOD WORK AND ADDITIONAL TESTS:  Labs acceptable     Your imaging and additional test are acceptable     In summary, you are overall in good health.     It was a pleasure to see you and perform this Executive Health evaluation. If I can be of any further assistance, or if you have any additional questions or concerns, please do not hesitate to let me know.    Sincerely,      Darrell Rao MD

## 2024-08-26 DIAGNOSIS — Z00.00 ROUTINE GENERAL MEDICAL EXAMINATION AT A HEALTH CARE FACILITY: Primary | ICD-10-CM

## 2024-12-04 ENCOUNTER — OFFICE VISIT (OUTPATIENT)
Dept: PODIATRY | Facility: CLINIC | Age: 44
End: 2024-12-04
Payer: COMMERCIAL

## 2024-12-04 ENCOUNTER — HOSPITAL ENCOUNTER (OUTPATIENT)
Dept: RADIOLOGY | Facility: HOSPITAL | Age: 44
Discharge: HOME | End: 2024-12-04
Attending: STUDENT IN AN ORGANIZED HEALTH CARE EDUCATION/TRAINING PROGRAM
Payer: COMMERCIAL

## 2024-12-04 VITALS
BODY MASS INDEX: 30.34 KG/M2 | HEART RATE: 58 BPM | DIASTOLIC BLOOD PRESSURE: 93 MMHG | SYSTOLIC BLOOD PRESSURE: 147 MMHG | WEIGHT: 211.44 LBS

## 2024-12-04 DIAGNOSIS — M79.672 CHRONIC FOOT PAIN, LEFT: ICD-10-CM

## 2024-12-04 DIAGNOSIS — M79.672 CHRONIC FOOT PAIN, LEFT: Primary | ICD-10-CM

## 2024-12-04 DIAGNOSIS — G89.29 CHRONIC FOOT PAIN, LEFT: ICD-10-CM

## 2024-12-04 DIAGNOSIS — G89.29 CHRONIC FOOT PAIN, LEFT: Primary | ICD-10-CM

## 2024-12-04 PROCEDURE — 99204 OFFICE O/P NEW MOD 45 MIN: CPT | Mod: S$GLB,,, | Performed by: STUDENT IN AN ORGANIZED HEALTH CARE EDUCATION/TRAINING PROGRAM

## 2024-12-04 PROCEDURE — 73630 X-RAY EXAM OF FOOT: CPT | Mod: 26,LT,, | Performed by: RADIOLOGY

## 2024-12-04 PROCEDURE — 73630 X-RAY EXAM OF FOOT: CPT | Mod: TC,LT

## 2024-12-04 PROCEDURE — 99999 PR PBB SHADOW E&M-EST. PATIENT-LVL III: CPT | Mod: PBBFAC,,, | Performed by: STUDENT IN AN ORGANIZED HEALTH CARE EDUCATION/TRAINING PROGRAM

## 2024-12-04 RX ORDER — DEXAMETHASONE 4 MG/1
4 TABLET ORAL DAILY
Qty: 14 TABLET | Refills: 0 | Status: SHIPPED | OUTPATIENT
Start: 2024-12-04

## 2024-12-04 NOTE — PROGRESS NOTES
Subjective:     Patient    Elpidio Mooney is a 44 y.o. male.    Problem    New to [facility]. Presents for left lateral forefoot pain that has been present for about a year, has significantly worsened over the past week. He has attempted arch supports, rest, change in footwear with minimal improvement; has attempted stretching which made the pain worse. The pain is described as stabbing, can be present when NWB but usually much better when NWB. Denies numbness, tingling, burning, etc. Does have chronic low back issues due to malformed disc.      History    History obtained from patient and review of medical records.     Past Medical History:   Diagnosis Date    Abscess of back 01/12/2018    Elevated ALT measurement 3/22/2021    Hyperlipidemia        Past Surgical History:   Procedure Laterality Date    FEMUR FRACTURE SURGERY  01/01/2000    FRACTURE SURGERY  07/2002    Broken right femur    HERNIA REPAIR      VASECTOMY  2010        Objective:     Vitals  Wt Readings from Last 1 Encounters:   12/04/24 95.9 kg (211 lb 6.7 oz)     Temp Readings from Last 1 Encounters:   03/26/23 97 °F (36.1 °C)     BP Readings from Last 1 Encounters:   12/04/24 (!) 147/93     Pulse Readings from Last 1 Encounters:   12/04/24 (!) 58       Dermatological Exam    Skin:   Pedal hair growth, skin color, and skin texture normal on left    Nails:  All nails normal in length, thickness, color    Vascular Exam    Arteries:   Posterior tibial artery palpable on left  Dorsalis pedis artery palpable on left    Veins:   Superficial veins unremarkable on left    Swelling:   None on left    Neurological Exam    [place] touch test:  Left foot protective sensation intact    Provocation Sign:  + at tibial nerve on left     Musculoskeletal Exam    Footwear:  Boot on right  Boot on left    Gait Exam:   Ambulatory Status: Ambulatory  Gait: Antalgic  Assistive Devices: None    Foot Progression Angle:  Normal on right  Normal on left     Left Lower  Extremity Additional Findings:  Mild pain on palpation of shaft of 5th metatarsal, reproduced with MMT of left foot-ankle but not with axial compression of the metatarsal  Left foot and ankle function, strength, and range of motion unremarkable except as noted above.    Imaging and Other Tests    Imaging:  Independently reviewed and interpreted imaging, findings are as follows:     12/04/24 left foot X rays: unremarkable     Assessment:     Encounter Diagnosis   Name Primary?    Chronic foot pain, left Yes        Plan:     I counseled the patient on his conditions, their implications and medical management.    Left foot pain: chronic exacerbated  -X rays ordered, reviewed, interpreted: unremarkable.   -Unclear etiology.   -PO dexamethasone.   -Ordered MRI.     Return to clinic after MRI, call sooner PRN.

## 2024-12-05 ENCOUNTER — HOSPITAL ENCOUNTER (OUTPATIENT)
Dept: RADIOLOGY | Facility: HOSPITAL | Age: 44
Discharge: HOME | End: 2024-12-05
Attending: STUDENT IN AN ORGANIZED HEALTH CARE EDUCATION/TRAINING PROGRAM
Payer: COMMERCIAL

## 2024-12-05 DIAGNOSIS — G89.29 CHRONIC FOOT PAIN, LEFT: ICD-10-CM

## 2024-12-05 DIAGNOSIS — M79.672 CHRONIC FOOT PAIN, LEFT: ICD-10-CM

## 2024-12-05 PROCEDURE — 73718 MRI LOWER EXTREMITY W/O DYE: CPT | Mod: 26,LT,, | Performed by: RADIOLOGY

## 2024-12-05 PROCEDURE — 73718 MRI LOWER EXTREMITY W/O DYE: CPT | Mod: TC,LT

## 2024-12-12 NOTE — PROGRESS NOTES
[facility]      CHIEF COMPLAINT: Executive health exam      HISTORY OF PRESENT ILLNESS: Elpidio Mooney is a 44 y.o. male who presents here today for an executive health examination.     Patient has a history of fatty liver - last saw Hepatology , due for F/U. Continues to be asymptomatic.    He recently saw Podiatry for foot pain and is on a steroid course at the moment.    Patient denies any acute concerns today.      PAST MEDICAL HISTORY:  History reviewed. No pertinent past medical history.      MEDICATIONS:    Current Outpatient Medications:     dexAMETHasone (DECADRON) 4 MG Tab, Take 1 tablet (4 mg total) by mouth once daily., Disp: 14 tablet, Rfl: 0      PAST SURGICAL HISTORY:  Past Surgical History:   Procedure Laterality Date    FEMUR FRACTURE SURGERY Right     INGUINAL HERNIA REPAIR Right     VASECTOMY N/A        SOCIAL HISTORY:  Social History     Tobacco Use    Smoking status: Former     Current packs/day: 0.00     Types: Cigarettes     Quit date:      Years since quittin.9    Smokeless tobacco: Never    Tobacco comments:     Social smoking as a teen - no regular use   Substance Use Topics    Alcohol use: Yes     Alcohol/week: 10.0 standard drinks of alcohol     Types: 10 Cans of beer per week    Drug use: No       ALLERGIES:  Review of patient's allergies indicates:  No Known Allergies      FAMILY HISTORY:  Family History   Problem Relation Name Age of Onset    Heart disease Father Denis Mooney         A fib    Skin cancer Father Denis Mooney     Cancer Father Denis Mooney     No Known Problems Sister      No Known Problems Brother      Heart disease Maternal Grandmother Gracia Friloux         CHF    Diabetes Maternal Grandmother Gracia Friloux     Stroke Maternal Grandmother Gracia Friloux     Cancer Maternal Grandfather Sathish Friradamesux         prostate vs rectal    Cancer Paternal Grandfather Claude Mooney         bone marrow?    Intellectual  disability Maternal Uncle Solitario Oleary     Cirrhosis Other           HEALTH MAINTENANCE:     IMMUNIZATIONS:                                                              Influenza:  completed  COVID: none on file; declined  RSV: not indicated  Tdap: last in 2014, updated declined  HPV: not indicated  Shingles: not indicated  Pneumonia: not indicated    SCREENINGS        Prostate cancer: ordered  Colon cancer: not indicated  Lung cancer: not indicated  HIV:  completed  Hepatitis C:  completed  STI: not indicated  Diabetes: ordered  Lipids: ordered  AAA: not indicated  Bone density: not indicated    LIFESTYLE         Exercise: active job, walks  Diet: healthy - packs lunches, tries to eat out no more than once per week  Substance use: as above  Employment: [employer] business  Family & living situation: lives in [place]    INTERVENTIONS        Statin: no  Aspirin: no        PHYSICAL EXAM:    /80 (BP Location: Left arm, Patient Position: Sitting)   Pulse 102   Wt 93.4 kg (205 lb 14.6 oz)   SpO2 99%   BMI 29.54 kg/m²     Physical Exam  Vitals and nursing note reviewed.   Constitutional:       General: He is not in acute distress.     Appearance: Normal appearance. He is not ill-appearing, toxic-appearing or diaphoretic.   HENT:      Head: Normocephalic and atraumatic.      Right Ear: Tympanic membrane, ear canal and external ear normal.      Left Ear: Tympanic membrane, ear canal and external ear normal.      Nose: Nose normal.      Mouth/Throat:      Mouth: Mucous membranes are moist.      Pharynx: Oropharynx is clear. No oropharyngeal exudate.   Eyes:      Extraocular Movements: Extraocular movements intact.      Conjunctiva/sclera: Conjunctivae normal.      Pupils: Pupils are equal, round, and reactive to light.   Cardiovascular:      Rate and Rhythm: Normal rate and regular rhythm.      Heart sounds: Normal heart sounds. No murmur heard.  Pulmonary:      Effort: Pulmonary effort is normal. No respiratory distress.       Breath sounds: Normal breath sounds. No wheezing.   Musculoskeletal:         General: No deformity. Normal range of motion.      Cervical back: Neck supple. No tenderness.   Lymphadenopathy:      Cervical: No cervical adenopathy.   Skin:     Findings: No lesion or rash.   Neurological:      General: No focal deficit present.      Mental Status: He is alert.      Motor: No weakness.      Gait: Gait normal.   Psychiatric:         Mood and Affect: Mood normal.         Behavior: Behavior normal.         Thought Content: Thought content normal.         Judgment: Judgment normal.              LAB REVIEW:    Lab Results   Component Value Date     12/13/2024    K 3.7 12/13/2024     12/13/2024    CO2 23 12/13/2024    BUN 22 (H) 12/13/2024    CREATININE 1.0 12/13/2024    CALCIUM 8.8 12/13/2024    ANIONGAP 8 12/13/2024    EGFRNORACEVR >60.0 12/13/2024       Lab Results   Component Value Date    ALT 28 12/13/2024    AST 18 12/13/2024    ALKPHOS 44 12/13/2024    BILITOT 0.6 12/13/2024       Lab Results   Component Value Date    WBC 9.57 12/13/2024    HGB 15.7 12/13/2024    HCT 45.3 12/13/2024    MCV 92 12/13/2024     12/13/2024       Lab Results   Component Value Date    TSH 4.125 (H) 12/13/2024    FREET4 0.93 12/13/2024       Lab Results   Component Value Date    HGBA1C 5.1 12/13/2024       Cholesterol   Date Value Ref Range Status   12/13/2024 206 (H) 120 - 199 mg/dL Final     Comment:     The National Cholesterol Education Program (NCEP) has set the  following guidelines (reference ranges) for Cholesterol:  Optimal.....................<200 mg/dL  Borderline High.............200-239 mg/dL  High........................> or = 240 mg/dL       HDL   Date Value Ref Range Status   12/13/2024 58 40 - 75 mg/dL Final     Comment:     The National Cholesterol Education Program (NCEP) has set the  following guidelines (reference values) for HDL Cholesterol:  Low...............<40 mg/dL  Optimal...........>60  mg/dL       LDL Cholesterol   Date Value Ref Range Status   12/13/2024 118.0 63.0 - 159.0 mg/dL Final     Comment:     The National Cholesterol Education Program (NCEP) has set the  following guidelines (reference values) for LDL Cholesterol:  Optimal.......................<130 mg/dL  Borderline High...............130-159 mg/dL  High..........................160-189 mg/dL  Very High.....................>190 mg/dL       Triglycerides   Date Value Ref Range Status   12/13/2024 150 30 - 150 mg/dL Final     Comment:     The National Cholesterol Education Program (NCEP) has set the  following guidelines (reference values) for triglycerides:  Normal......................<150 mg/dL  Borderline High.............150-199 mg/dL  High........................200-499 mg/dL          The 10-year ASCVD risk score (Barber EMERY, et al., 2019) is: 1.6%    Values used to calculate the score:      Age: 44 years      Sex: Male      Is Non- : No      Diabetic: No      Tobacco smoker: No      Systolic Blood Pressure: 138 mmHg      Is BP treated: No      HDL Cholesterol: 58 mg/dL      Total Cholesterol: 206 mg/dL      PSA, Screen (ng/mL)   Date Value   12/13/2024 0.28           IMAGING:    EKG    Results for orders placed or performed during the hospital encounter of 12/13/24   EKG 12-lead    Collection Time: 12/13/24  8:20 AM   Result Value Ref Range    QRS Duration 106 ms    [facility] QTC Calculation 417 ms    Narrative    Test Reason : Z00.00,    Vent. Rate :  59 BPM     Atrial Rate :  59 BPM     P-R Int : 152 ms          QRS Dur : 106 ms      QT Int : 422 ms       P-R-T Axes :  36   5  21 degrees    QTcB Int : 417 ms    Sinus bradycardia  Otherwise normal ECG  When compared with ECG of 18-Aug-2023 08:36,  No significant change was found  Confirmed by Raquel Martin ([id number]) on 12/13/2024 8:50:15 AM    Referred By: KENJI ALDANA           Confirmed By: Raquel Martin           ASSESSMENT & PLAN:    1. Annual physical  exam  Health screenings and immunizations due as below  History, physical exam findings, imaging results, and lab results are all acceptable with exception of what is detailed below    2. Fatty liver  Assessment & Plan:  LFT normal today  Continues to be asymptomatic  Overdue for F/U with Hepatology - referral placed  Orders:  -     Ambulatory referral/consult to Hepatology; Future; Expected date: 12/20/2024    3. Moderate mixed hyperlipidemia not requiring statin therapy  Assessment & Plan:  ASCVD risk 1.6% no statin indicated  Continue healthy lifestyle  Recheck next year    4. Overweight (BMI 25.0-29.9)  Assessment & Plan:  BMI 29.54, weight loss 3 lb since last year  Continue healthy lifestyle     5. Vaccination refused by patient  Declines Tetanus & COVID vaccines today                 Ene Vora MD  [facility]  12/13/2024

## 2024-12-13 ENCOUNTER — CLINICAL SUPPORT (OUTPATIENT)
Dept: INTERNAL MEDICINE | Facility: CLINIC | Age: 44
End: 2024-12-13
Payer: COMMERCIAL

## 2024-12-13 ENCOUNTER — HOSPITAL ENCOUNTER (OUTPATIENT)
Dept: CARDIOLOGY | Facility: CLINIC | Age: 44
Discharge: HOME | End: 2024-12-13
Payer: COMMERCIAL

## 2024-12-13 ENCOUNTER — HOSPITAL ENCOUNTER (OUTPATIENT)
Dept: RADIOLOGY | Facility: HOSPITAL | Age: 44
Discharge: HOME | End: 2024-12-13
Attending: STUDENT IN AN ORGANIZED HEALTH CARE EDUCATION/TRAINING PROGRAM
Payer: COMMERCIAL

## 2024-12-13 ENCOUNTER — OFFICE VISIT (OUTPATIENT)
Dept: INTERNAL MEDICINE | Facility: CLINIC | Age: 44
End: 2024-12-13
Payer: COMMERCIAL

## 2024-12-13 VITALS
DIASTOLIC BLOOD PRESSURE: 80 MMHG | SYSTOLIC BLOOD PRESSURE: 138 MMHG | BODY MASS INDEX: 29.54 KG/M2 | WEIGHT: 205.94 LBS | OXYGEN SATURATION: 99 % | HEART RATE: 102 BPM

## 2024-12-13 DIAGNOSIS — Z00.00 ANNUAL PHYSICAL EXAM: Primary | ICD-10-CM

## 2024-12-13 DIAGNOSIS — Z00.00 ROUTINE GENERAL MEDICAL EXAMINATION AT A HEALTH CARE FACILITY: Primary | ICD-10-CM

## 2024-12-13 DIAGNOSIS — Z00.00 ROUTINE GENERAL MEDICAL EXAMINATION AT A HEALTH CARE FACILITY: ICD-10-CM

## 2024-12-13 DIAGNOSIS — E66.3 OVERWEIGHT (BMI 25.0-29.9): ICD-10-CM

## 2024-12-13 DIAGNOSIS — E78.2 MODERATE MIXED HYPERLIPIDEMIA NOT REQUIRING STATIN THERAPY: ICD-10-CM

## 2024-12-13 DIAGNOSIS — K76.0 FATTY LIVER: ICD-10-CM

## 2024-12-13 DIAGNOSIS — Z28.21 VACCINATION REFUSED BY PATIENT: ICD-10-CM

## 2024-12-13 LAB
ALBUMIN SERPL BCP-MCNC: 3.9 G/DL (ref 3.5–5.2)
ALP SERPL-CCNC: 44 U/L (ref 40–150)
ALT SERPL W/O P-5'-P-CCNC: 28 U/L (ref 10–44)
ANION GAP SERPL CALC-SCNC: 8 MMOL/L (ref 8–16)
AST SERPL-CCNC: 18 U/L (ref 10–40)
BILIRUB SERPL-MCNC: 0.6 MG/DL (ref 0.1–1)
BUN SERPL-MCNC: 22 MG/DL (ref 6–20)
CALCIUM SERPL-MCNC: 8.8 MG/DL (ref 8.7–10.5)
CHLORIDE SERPL-SCNC: 107 MMOL/L (ref 95–110)
CHOLEST SERPL-MCNC: 206 MG/DL (ref 120–199)
CHOLEST/HDLC SERPL: 3.6 {RATIO} (ref 2–5)
CO2 SERPL-SCNC: 23 MMOL/L (ref 23–29)
COMPLEXED PSA SERPL-MCNC: 0.28 NG/ML (ref 0–4)
CREAT SERPL-MCNC: 1 MG/DL (ref 0.5–1.4)
ERYTHROCYTE [DISTWIDTH] IN BLOOD BY AUTOMATED COUNT: 12.7 % (ref 11.5–14.5)
EST. GFR  (NO RACE VARIABLE): >60 ML/MIN/1.73 M^2
ESTIMATED AVG GLUCOSE: 100 MG/DL (ref 68–131)
GLUCOSE SERPL-MCNC: 86 MG/DL (ref 70–110)
HBA1C MFR BLD: 5.1 % (ref 4–5.6)
HCT VFR BLD AUTO: 45.3 % (ref 40–54)
HDLC SERPL-MCNC: 58 MG/DL (ref 40–75)
HDLC SERPL: 28.2 % (ref 20–50)
HGB BLD-MCNC: 15.7 G/DL (ref 14–18)
LDLC SERPL CALC-MCNC: 118 MG/DL (ref 63–159)
MCH RBC QN AUTO: 31.8 PG (ref 27–31)
MCHC RBC AUTO-ENTMCNC: 34.7 G/DL (ref 32–36)
MCV RBC AUTO: 92 FL (ref 82–98)
NONHDLC SERPL-MCNC: 148 MG/DL
OHS QRS DURATION: 106 MS
OHS QTC CALCULATION: 417 MS
PLATELET # BLD AUTO: 263 K/UL (ref 150–450)
PMV BLD AUTO: 10.3 FL (ref 9.2–12.9)
POTASSIUM SERPL-SCNC: 3.7 MMOL/L (ref 3.5–5.1)
PROT SERPL-MCNC: 6.4 G/DL (ref 6–8.4)
RBC # BLD AUTO: 4.93 M/UL (ref 4.6–6.2)
SODIUM SERPL-SCNC: 138 MMOL/L (ref 136–145)
T4 FREE SERPL-MCNC: 0.93 NG/DL (ref 0.71–1.51)
TRIGL SERPL-MCNC: 150 MG/DL (ref 30–150)
TSH SERPL DL<=0.005 MIU/L-ACNC: 4.12 UIU/ML (ref 0.4–4)
WBC # BLD AUTO: 9.57 K/UL (ref 3.9–12.7)

## 2024-12-13 PROCEDURE — 80053 COMPREHEN METABOLIC PANEL: CPT | Performed by: STUDENT IN AN ORGANIZED HEALTH CARE EDUCATION/TRAINING PROGRAM

## 2024-12-13 PROCEDURE — 93005 ELECTROCARDIOGRAM TRACING: CPT | Mod: S$GLB,,, | Performed by: STUDENT IN AN ORGANIZED HEALTH CARE EDUCATION/TRAINING PROGRAM

## 2024-12-13 PROCEDURE — 85027 COMPLETE CBC AUTOMATED: CPT | Performed by: STUDENT IN AN ORGANIZED HEALTH CARE EDUCATION/TRAINING PROGRAM

## 2024-12-13 PROCEDURE — 84443 ASSAY THYROID STIM HORMONE: CPT | Performed by: STUDENT IN AN ORGANIZED HEALTH CARE EDUCATION/TRAINING PROGRAM

## 2024-12-13 PROCEDURE — 84439 ASSAY OF FREE THYROXINE: CPT | Performed by: STUDENT IN AN ORGANIZED HEALTH CARE EDUCATION/TRAINING PROGRAM

## 2024-12-13 PROCEDURE — 80061 LIPID PANEL: CPT | Performed by: STUDENT IN AN ORGANIZED HEALTH CARE EDUCATION/TRAINING PROGRAM

## 2024-12-13 PROCEDURE — 84153 ASSAY OF PSA TOTAL: CPT | Performed by: STUDENT IN AN ORGANIZED HEALTH CARE EDUCATION/TRAINING PROGRAM

## 2024-12-13 PROCEDURE — 99999 PR PBB SHADOW E&M-EST. PATIENT-LVL IV: CPT | Mod: PBBFAC,,, | Performed by: STUDENT IN AN ORGANIZED HEALTH CARE EDUCATION/TRAINING PROGRAM

## 2024-12-13 PROCEDURE — 93010 ELECTROCARDIOGRAM REPORT: CPT | Mod: S$GLB,,, | Performed by: INTERNAL MEDICINE

## 2024-12-13 PROCEDURE — 99999 PR PBB SHADOW E&M-EST. PATIENT-LVL I: CPT | Mod: PBBFAC,,,

## 2024-12-13 PROCEDURE — 83036 HEMOGLOBIN GLYCOSYLATED A1C: CPT | Performed by: STUDENT IN AN ORGANIZED HEALTH CARE EDUCATION/TRAINING PROGRAM

## 2024-12-13 NOTE — PATIENT INSTRUCTIONS
Referral to Hepatology has been ordered. They will call to schedule; otherwise you may schedule appointments when you check out today, online, or by calling [phone].      I recommend you have the Tetanus (tdap) and COVID vaccination(s). You may return to [facility] or have completed at an outside pharmacy at any time.

## 2024-12-13 NOTE — LETTER
December 13, 2024    Elpidio Mooney  [address]  [place] [address]             Junaid Baker [facility]  [address]  [facility] [address]  Phone: [phone]  Fax: [phone] Dear Mr. Mooney:      It was a pleasure seeing you in clinic for your Executive Health exam on 12/13/2024. Enclosed is a copy of the clinic note detailing the history, physical exam findings, laboratory studies, and recommendations at that time. Thank you for allowing me to participate in your medical care.        If you have any questions or concerns, please don't hesitate to call.      Sincerely,        Ene Vora MD

## 2024-12-17 DIAGNOSIS — K76.0 FATTY LIVER: Primary | ICD-10-CM

## 2024-12-17 DIAGNOSIS — E88.01 LOW PLASMA ALPHA-1 ANTITRYPSIN: ICD-10-CM

## 2025-01-08 ENCOUNTER — PATIENT MESSAGE (OUTPATIENT)
Facility: CLINIC | Age: 45
End: 2025-01-08
Payer: COMMERCIAL

## 2025-01-08 ENCOUNTER — TELEPHONE (OUTPATIENT)
Facility: CLINIC | Age: 45
End: 2025-01-08
Payer: COMMERCIAL

## 2025-01-08 NOTE — TELEPHONE ENCOUNTER
Kathy was called to [facility] follow up appt with MEGAN Centeno virtual /in person or [facility]